# Patient Record
Sex: FEMALE | Race: OTHER | HISPANIC OR LATINO
[De-identification: names, ages, dates, MRNs, and addresses within clinical notes are randomized per-mention and may not be internally consistent; named-entity substitution may affect disease eponyms.]

---

## 2020-03-24 ENCOUNTER — APPOINTMENT (OUTPATIENT)
Dept: INTERNAL MEDICINE | Facility: CLINIC | Age: 55
End: 2020-03-24

## 2020-06-04 ENCOUNTER — APPOINTMENT (OUTPATIENT)
Dept: INTERNAL MEDICINE | Facility: CLINIC | Age: 55
End: 2020-06-04

## 2020-07-07 ENCOUNTER — APPOINTMENT (OUTPATIENT)
Dept: INTERNAL MEDICINE | Facility: CLINIC | Age: 55
End: 2020-07-07
Payer: COMMERCIAL

## 2020-07-07 ENCOUNTER — NON-APPOINTMENT (OUTPATIENT)
Age: 55
End: 2020-07-07

## 2020-07-07 VITALS
HEIGHT: 60 IN | BODY MASS INDEX: 25.13 KG/M2 | HEART RATE: 79 BPM | WEIGHT: 128 LBS | DIASTOLIC BLOOD PRESSURE: 80 MMHG | SYSTOLIC BLOOD PRESSURE: 110 MMHG

## 2020-07-07 DIAGNOSIS — Z78.9 OTHER SPECIFIED HEALTH STATUS: ICD-10-CM

## 2020-07-07 PROCEDURE — 93000 ELECTROCARDIOGRAM COMPLETE: CPT

## 2020-07-07 PROCEDURE — 36415 COLL VENOUS BLD VENIPUNCTURE: CPT

## 2020-07-07 PROCEDURE — 99213 OFFICE O/P EST LOW 20 MIN: CPT | Mod: 25

## 2020-07-07 PROCEDURE — 99386 PREV VISIT NEW AGE 40-64: CPT | Mod: 25

## 2020-07-07 NOTE — REVIEW OF SYSTEMS
[Chest Pain] : chest pain [Dysuria] : dysuria [Joint Pain] : joint pain [Headache] : headache [Negative] : Respiratory [Back Pain] : back pain [Anxiety] : anxiety [Fever] : no fever [Chills] : no chills [Hearing Loss] : no hearing loss [Abdominal Pain] : no abdominal pain [Palpitations] : no palpitations [Nausea] : no nausea [Vomiting] : no vomiting [Heartburn] : no heartburn [Frequency] : no frequency [Hematuria] : no hematuria [Joint Swelling] : no joint swelling [Dizziness] : no dizziness [Skin Rash] : no skin rash [Muscle Weakness] : no muscle weakness [Memory Loss] : no memory loss [Depression] : no depression [Easy Bleeding] : no easy bleeding [FreeTextEntry5] : right sided cp x yrs off and on sans n/v/d, not related to activity [FreeTextEntry3] : last eye exam >4 yrs ago [FreeTextEntry7] : c [FreeTextEntry8] : no kidney disease or stones

## 2020-07-07 NOTE — PLAN
[FreeTextEntry1] : check labs and EKG\par Echocardiogram for heart murmur\par referral for colonoscopy\par Covid antibody\par screening mammograms\par Return p.r.n.

## 2020-07-07 NOTE — ASSESSMENT
[FreeTextEntry1] : CPE- healthy but mildly anxious woman with a variety of mild somatic complaints unchanged for many years. Will request old records and immunizations. Order screening mammos. She states last PAP was <1 yr ago. Refer for colonoscopy as her last one was 5 years ago and she had polyps reported.\par \par heart murmur- she does not recollect anyone telling her she had a heart murmur before. Will check echocardiogram although I suspect this is a benign flow murmur. I explained this to the patient as opposed to structural valvular disease producing a murmur. she appeared to understand.\par \par Hyperlipidemia- Check lipid profile and liver function tests. Urged low cholesterol diet. Continue medications. LDL goal is less than 130 mg/dl. Last LDL was unknown.\par \par She consents to covid ab testing but declines HIV.

## 2020-07-07 NOTE — HEALTH RISK ASSESSMENT
[Patient reported colonoscopy was abnormal] : Patient reported colonoscopy was abnormal [Patient reported PAP Smear was normal] : Patient reported PAP Smear was normal [HIV test declined] : HIV test declined [ColonoscopyDate] : 07/15 [PapSmearDate] : 07/19 [ColonoscopyComments] : +polyp [HIVDate] : 07/20

## 2020-07-07 NOTE — HISTORY OF PRESENT ILLNESS
[FreeTextEntry1] : new cpe [de-identified] : Former [place] resident; had last cpe 1 yr ao. Told of mild chol issue

## 2020-07-07 NOTE — PHYSICAL EXAM
[No Acute Distress] : no acute distress [Well Developed] : well developed [Well Nourished] : well nourished [PERRL] : pupils equal round and reactive to light [Normal Sclera/Conjunctiva] : normal sclera/conjunctiva [Well-Appearing] : well-appearing [EOMI] : extraocular movements intact [Normal Oropharynx] : the oropharynx was normal [Normal Outer Ear/Nose] : the outer ears and nose were normal in appearance [No JVD] : no jugular venous distention [No Lymphadenopathy] : no lymphadenopathy [Supple] : supple [Thyroid Normal, No Nodules] : the thyroid was normal and there were no nodules present [No Respiratory Distress] : no respiratory distress  [No Accessory Muscle Use] : no accessory muscle use [Clear to Auscultation] : lungs were clear to auscultation bilaterally [Regular Rhythm] : with a regular rhythm [Normal Rate] : normal rate  [No Murmur] : no murmur heard [No Carotid Bruits] : no carotid bruits [Normal S1, S2] : normal S1 and S2 [No Varicosities] : no varicosities [No Abdominal Bruit] : a ~M bruit was not heard ~T in the abdomen [No Palpable Aorta] : no palpable aorta [Pedal Pulses Present] : the pedal pulses are present [No Edema] : there was no peripheral edema [No Extremity Clubbing/Cyanosis] : no extremity clubbing/cyanosis [Non Tender] : non-tender [Soft] : abdomen soft [No Masses] : no abdominal mass palpated [No HSM] : no HSM [Non-distended] : non-distended [Normal Bowel Sounds] : normal bowel sounds [Normal Anterior Cervical Nodes] : no anterior cervical lymphadenopathy [Normal Posterior Cervical Nodes] : no posterior cervical lymphadenopathy [No CVA Tenderness] : no CVA  tenderness [No Spinal Tenderness] : no spinal tenderness [No Rash] : no rash [No Joint Swelling] : no joint swelling [Grossly Normal Strength/Tone] : grossly normal strength/tone [Coordination Grossly Intact] : coordination grossly intact [No Focal Deficits] : no focal deficits [Deep Tendon Reflexes (DTR)] : deep tendon reflexes were 2+ and symmetric [Normal Gait] : normal gait [Normal Affect] : the affect was normal [Normal Insight/Judgement] : insight and judgment were intact

## 2020-07-08 LAB
ALBUMIN SERPL ELPH-MCNC: 4.9 G/DL
ALP BLD-CCNC: 89 U/L
ALT SERPL-CCNC: 31 U/L
ANION GAP SERPL CALC-SCNC: 18 MMOL/L
APPEARANCE: CLEAR
AST SERPL-CCNC: 23 U/L
BASOPHILS # BLD AUTO: 0.04 K/UL
BASOPHILS NFR BLD AUTO: 0.5 %
BILIRUB SERPL-MCNC: 0.5 MG/DL
BILIRUBIN URINE: NEGATIVE
BLOOD URINE: NEGATIVE
BUN SERPL-MCNC: 14 MG/DL
CALCIUM SERPL-MCNC: 9.9 MG/DL
CHLORIDE SERPL-SCNC: 104 MMOL/L
CHOLEST SERPL-MCNC: 240 MG/DL
CHOLEST/HDLC SERPL: 5.5 RATIO
CO2 SERPL-SCNC: 20 MMOL/L
COLOR: NORMAL
CREAT SERPL-MCNC: 0.51 MG/DL
EOSINOPHIL # BLD AUTO: 0.1 K/UL
EOSINOPHIL NFR BLD AUTO: 1.3 %
GLUCOSE QUALITATIVE U: NEGATIVE
GLUCOSE SERPL-MCNC: 96 MG/DL
HCT VFR BLD CALC: 43.5 %
HDLC SERPL-MCNC: 44 MG/DL
HGB BLD-MCNC: 13.7 G/DL
IMM GRANULOCYTES NFR BLD AUTO: 0.1 %
KETONES URINE: NEGATIVE
LDLC SERPL CALC-MCNC: 159 MG/DL
LEUKOCYTE ESTERASE URINE: NEGATIVE
LYMPHOCYTES # BLD AUTO: 3.06 K/UL
LYMPHOCYTES NFR BLD AUTO: 41.1 %
MAN DIFF?: NORMAL
MCHC RBC-ENTMCNC: 30.6 PG
MCHC RBC-ENTMCNC: 31.5 GM/DL
MCV RBC AUTO: 97.3 FL
MONOCYTES # BLD AUTO: 0.34 K/UL
MONOCYTES NFR BLD AUTO: 4.6 %
NEUTROPHILS # BLD AUTO: 3.89 K/UL
NEUTROPHILS NFR BLD AUTO: 52.4 %
NITRITE URINE: NEGATIVE
PH URINE: 6
PLATELET # BLD AUTO: 253 K/UL
POTASSIUM SERPL-SCNC: 4.4 MMOL/L
PROT SERPL-MCNC: 7.3 G/DL
PROTEIN URINE: NEGATIVE
RBC # BLD: 4.47 M/UL
RBC # FLD: 14 %
SARS-COV-2 IGG SERPL IA-ACNC: 0.08 INDEX
SARS-COV-2 IGG SERPL QL IA: NEGATIVE
SODIUM SERPL-SCNC: 141 MMOL/L
SPECIFIC GRAVITY URINE: 1.02
TRIGL SERPL-MCNC: 188 MG/DL
TSH SERPL-ACNC: 1.63 UIU/ML
UROBILINOGEN URINE: NORMAL
WBC # FLD AUTO: 7.44 K/UL

## 2020-07-20 ENCOUNTER — APPOINTMENT (OUTPATIENT)
Dept: GASTROENTEROLOGY | Facility: CLINIC | Age: 55
End: 2020-07-20
Payer: COMMERCIAL

## 2020-07-20 VITALS — TEMPERATURE: 98.7 F | HEART RATE: 67 BPM | DIASTOLIC BLOOD PRESSURE: 60 MMHG | SYSTOLIC BLOOD PRESSURE: 116 MMHG

## 2020-07-20 PROCEDURE — 99202 OFFICE O/P NEW SF 15 MIN: CPT

## 2020-07-20 NOTE — PHYSICAL EXAM
[General Appearance - Alert] : alert [General Appearance - In No Acute Distress] : in no acute distress [Sclera] : the sclera and conjunctiva were normal [Outer Ear] : the ears and nose were normal in appearance [Neck Appearance] : the appearance of the neck was normal [] : no respiratory distress [Abdomen Soft] : soft [Abdomen Tenderness] : non-tender [FreeTextEntry1] : deferred to colonoscopy [Skin Color & Pigmentation] : normal skin color and pigmentation [Abnormal Walk] : normal gait [Oriented To Time, Place, And Person] : oriented to person, place, and time [Cranial Nerves] : cranial nerves 2-12 were intact

## 2020-07-20 NOTE — HISTORY OF PRESENT ILLNESS
[FreeTextEntry1] : 54 year F with HLD, heart murmur\par seen at the request of Dr. Landaverde for the evaluation of colon cancer screening. \par Last colonoscopy in the [facility] in 2015. this was reportedly done for rectal bleeding, a polyp was found and she was told to return in 5 years. \par she has occasional constipation. \par \par Patient denies abdominal pain , n/v/heartburn, reflux, dysphagia/odynophagia, change in bowel habits, diarrhea,  brbpr, melena. no weight loss.  Good appetite and energy level. Patient has daily BM, denies regular NSAID use. \par \par fam hx: negative for colon polyps or colon cancer\par

## 2020-07-21 ENCOUNTER — RESULT REVIEW (OUTPATIENT)
Age: 55
End: 2020-07-21

## 2020-08-02 ENCOUNTER — RESULT REVIEW (OUTPATIENT)
Age: 55
End: 2020-08-02

## 2020-08-03 ENCOUNTER — RESULT REVIEW (OUTPATIENT)
Age: 55
End: 2020-08-03

## 2020-08-04 ENCOUNTER — APPOINTMENT (OUTPATIENT)
Dept: GASTROENTEROLOGY | Facility: HOSPITAL | Age: 55
End: 2020-08-04

## 2020-09-05 ENCOUNTER — APPOINTMENT (OUTPATIENT)
Dept: INTERNAL MEDICINE | Facility: CLINIC | Age: 55
End: 2020-09-05
Payer: COMMERCIAL

## 2020-09-05 VITALS
SYSTOLIC BLOOD PRESSURE: 118 MMHG | DIASTOLIC BLOOD PRESSURE: 62 MMHG | OXYGEN SATURATION: 99 % | WEIGHT: 125 LBS | BODY MASS INDEX: 24.41 KG/M2 | HEART RATE: 68 BPM | TEMPERATURE: 97.8 F

## 2020-09-05 DIAGNOSIS — R35.0 FREQUENCY OF MICTURITION: ICD-10-CM

## 2020-09-05 LAB
BILIRUB UR QL STRIP: NORMAL
GLUCOSE UR-MCNC: NORMAL
HCG UR QL: 0.2 EU/DL
HGB UR QL STRIP.AUTO: NORMAL
KETONES UR-MCNC: NORMAL
LEUKOCYTE ESTERASE UR QL STRIP: NORMAL
NITRITE UR QL STRIP: NORMAL
PH UR STRIP: 5.5
PROT UR STRIP-MCNC: NORMAL
SP GR UR STRIP: NORMAL

## 2020-09-05 PROCEDURE — 99213 OFFICE O/P EST LOW 20 MIN: CPT | Mod: 25

## 2020-09-05 PROCEDURE — G0442 ANNUAL ALCOHOL SCREEN 15 MIN: CPT

## 2020-09-05 PROCEDURE — 81003 URINALYSIS AUTO W/O SCOPE: CPT | Mod: QW

## 2020-09-05 NOTE — REVIEW OF SYSTEMS
[Chills] : no chills [Fever] : no fever [Dysuria] : no dysuria [Incontinence] : no incontinence [Hematuria] : no hematuria [Frequency] : frequency

## 2020-09-05 NOTE — ASSESSMENT
[FreeTextEntry1] : urinary frequency- I discussed with patient that her urinalysis today and one 2 months ago were both benign and at 6 months of symptoms argue strongly against an infectious process. She could have a spastic bladder or an other structural abnormality for which I recommend she see a urologist. Empiric trial of 3 days of antibiotic discussed with patient.  Refer to Dr. Cain.

## 2020-09-09 LAB — BACTERIA UR CULT: ABNORMAL

## 2020-10-17 ENCOUNTER — APPOINTMENT (OUTPATIENT)
Dept: INTERNAL MEDICINE | Facility: CLINIC | Age: 55
End: 2020-10-17
Payer: COMMERCIAL

## 2020-10-17 VITALS — HEART RATE: 66 BPM | HEIGHT: 60 IN | SYSTOLIC BLOOD PRESSURE: 110 MMHG | DIASTOLIC BLOOD PRESSURE: 60 MMHG

## 2020-10-17 VITALS — WEIGHT: 130 LBS | BODY MASS INDEX: 25.39 KG/M2

## 2020-10-17 PROCEDURE — 99213 OFFICE O/P EST LOW 20 MIN: CPT | Mod: 25

## 2020-10-17 PROCEDURE — G0008: CPT

## 2020-10-17 PROCEDURE — 90686 IIV4 VACC NO PRSV 0.5 ML IM: CPT

## 2020-10-17 NOTE — ASSESSMENT
[FreeTextEntry1] : Breast- no abnl noted. D/w pt periareolar swellings that come and go over years not likely to be malignant and her recent mammos were negative. I in addition, feels no abnl mass. Reassured.\par \par Risks/benefits of flu vaccine discussed with patient and patient understands and accepts.\par \par Reviewed old chart: Old chart reviewed: chronic back and other pains; hct 31.6 mcv 83 in 2018

## 2020-10-17 NOTE — HISTORY OF PRESENT ILLNESS
[FreeTextEntry8] : Here for flu vaccine but also:\par She complains of several years history of minute breast swellings around her nipples, particularly her left. For the last 3 weeks her left breast nipple has these swellings. They come and go, are < 5 mm in size, soft, are not tender or painful and then not associated with any nipple discharge. Her last mammogram was 3 months ago and was normal.

## 2020-10-17 NOTE — PHYSICAL EXAM
[Normal Appearance] : normal in appearance [Normal] : no acute distress, well nourished, well developed and well-appearing [No Masses] : no palpable masses [No Nipple Discharge] : no nipple discharge [de-identified] : no warmth, erythema, or tenderness left breast

## 2021-02-10 ENCOUNTER — APPOINTMENT (OUTPATIENT)
Dept: INTERNAL MEDICINE | Facility: CLINIC | Age: 56
End: 2021-02-10
Payer: COMMERCIAL

## 2021-02-10 ENCOUNTER — NON-APPOINTMENT (OUTPATIENT)
Age: 56
End: 2021-02-10

## 2021-02-10 VITALS
SYSTOLIC BLOOD PRESSURE: 122 MMHG | HEART RATE: 68 BPM | DIASTOLIC BLOOD PRESSURE: 70 MMHG | BODY MASS INDEX: 25.13 KG/M2 | WEIGHT: 128 LBS | HEIGHT: 60 IN

## 2021-02-10 PROCEDURE — 99072 ADDL SUPL MATRL&STAF TM PHE: CPT

## 2021-02-10 PROCEDURE — 99213 OFFICE O/P EST LOW 20 MIN: CPT | Mod: 25

## 2021-02-10 NOTE — REVIEW OF SYSTEMS
[Chest Pain] : chest pain [Abdominal Pain] : abdominal pain [Chills] : no chills [Fever] : no fever [Recent Change In Weight] : ~T no recent weight change [Shortness Of Breath] : no shortness of breath [Nausea] : no nausea [Vomiting] : no vomiting [Heartburn] : no heartburn [FreeTextEntry5] : right back lung

## 2021-02-10 NOTE — ASSESSMENT
[FreeTextEntry1] : Right flank/upper back pain- likely mm-skel. No evidence for intrinsic lung pathology and she has no pulmonary symptoms.\par \par lower abdominal bloating- unknown organic pathology presently however depending on what medicine [facility] urologist gave her she could be experiencing a side effect of it. Specifically in mind his constipation with slowing of bowel motility and hence some bloating. I will check her labs and include inflammatory markers. She will call me back with the name of the urologic medication which I suspect is in the ditropan family.

## 2021-02-10 NOTE — HISTORY OF PRESENT ILLNESS
[FreeTextEntry8] : Several months h/o with lower abd bloating constantly. + fever, with diarrhea 2 months ago. Covid negative then. Also c/o sharp right flank discomfort radiating to right mid lung burning. Taking med from  but does not know name x 2 months

## 2021-02-11 ENCOUNTER — NON-APPOINTMENT (OUTPATIENT)
Age: 56
End: 2021-02-11

## 2021-02-11 LAB
ALBUMIN SERPL ELPH-MCNC: 4.9 G/DL
ALP BLD-CCNC: 91 U/L
ALT SERPL-CCNC: 31 U/L
ANION GAP SERPL CALC-SCNC: 14 MMOL/L
AST SERPL-CCNC: 25 U/L
BASOPHILS # BLD AUTO: 0.04 K/UL
BASOPHILS NFR BLD AUTO: 0.6 %
BILIRUB SERPL-MCNC: 0.6 MG/DL
BUN SERPL-MCNC: 11 MG/DL
CALCIUM SERPL-MCNC: 9.6 MG/DL
CHLORIDE SERPL-SCNC: 104 MMOL/L
CO2 SERPL-SCNC: 21 MMOL/L
CREAT SERPL-MCNC: 0.58 MG/DL
CRP SERPL-MCNC: 0.26 MG/DL
EOSINOPHIL # BLD AUTO: 0.18 K/UL
EOSINOPHIL NFR BLD AUTO: 2.6 %
ERYTHROCYTE [SEDIMENTATION RATE] IN BLOOD BY WESTERGREN METHOD: 23 MM/HR
GLUCOSE SERPL-MCNC: 95 MG/DL
HCT VFR BLD CALC: 39.6 %
HGB BLD-MCNC: 13 G/DL
IMM GRANULOCYTES NFR BLD AUTO: 0.3 %
LYMPHOCYTES # BLD AUTO: 3.36 K/UL
LYMPHOCYTES NFR BLD AUTO: 48.7 %
MAN DIFF?: NORMAL
MCHC RBC-ENTMCNC: 30.7 PG
MCHC RBC-ENTMCNC: 32.8 GM/DL
MCV RBC AUTO: 93.6 FL
MONOCYTES # BLD AUTO: 0.39 K/UL
MONOCYTES NFR BLD AUTO: 5.7 %
NEUTROPHILS # BLD AUTO: 2.91 K/UL
NEUTROPHILS NFR BLD AUTO: 42.1 %
PLATELET # BLD AUTO: 270 K/UL
POTASSIUM SERPL-SCNC: 3.9 MMOL/L
PROT SERPL-MCNC: 7.5 G/DL
RBC # BLD: 4.23 M/UL
RBC # FLD: 13.5 %
SODIUM SERPL-SCNC: 139 MMOL/L
WBC # FLD AUTO: 6.9 K/UL

## 2021-03-22 ENCOUNTER — NON-APPOINTMENT (OUTPATIENT)
Age: 56
End: 2021-03-22

## 2021-04-14 ENCOUNTER — APPOINTMENT (OUTPATIENT)
Dept: INTERNAL MEDICINE | Facility: CLINIC | Age: 56
End: 2021-04-14
Payer: COMMERCIAL

## 2021-04-14 VITALS
DIASTOLIC BLOOD PRESSURE: 80 MMHG | BODY MASS INDEX: 25.13 KG/M2 | SYSTOLIC BLOOD PRESSURE: 130 MMHG | HEIGHT: 60 IN | WEIGHT: 128 LBS

## 2021-04-14 PROCEDURE — 99213 OFFICE O/P EST LOW 20 MIN: CPT | Mod: 25

## 2021-04-14 PROCEDURE — 99072 ADDL SUPL MATRL&STAF TM PHE: CPT

## 2021-04-14 NOTE — ASSESSMENT
[FreeTextEntry1] : elevated blood pressure without diagnosis of hypertension- I discussed with her that people's blood pressure is often elevated in the emergency room especially when there are no pain. Review of her blood pressures during  office visits have always been normal, and she does not have hypertension.\par \par Elevated fingerstick glucose- I discussed with very that sugars that are nonfasting we'll often appear to be elevated. She has just finished eating lunch now tends that sugar will be elevated also. I explained that there are criteria for diagnosing diabetes based on fasting and random glucoses as well his A1c. I explained what an A1c was. We will check a random sugar and A1c now.

## 2021-04-14 NOTE — PHYSICAL EXAM
[Well Nourished] : well nourished [Well Developed] : well developed [Well-Appearing] : well-appearing [Normal Rate] : normal rate

## 2021-04-14 NOTE — HISTORY OF PRESENT ILLNESS
[FreeTextEntry1] : here to rule out diabetes and hypertension [de-identified] : She reports she was injured at work last week and on 4/8/21 went to [facility] emergency room where she was told her blood pressure was high and that a fingerstick sugar was elevated. She was not fasting at the time and does not remember the value of the sugar and nor value of her blood pressure. I have reviewed the record remotely and see her blood pressure was 160/78.

## 2021-04-15 ENCOUNTER — NON-APPOINTMENT (OUTPATIENT)
Age: 56
End: 2021-04-15

## 2021-04-15 LAB
ANION GAP SERPL CALC-SCNC: 12 MMOL/L
BUN SERPL-MCNC: 17 MG/DL
CALCIUM SERPL-MCNC: 9.4 MG/DL
CHLORIDE SERPL-SCNC: 106 MMOL/L
CO2 SERPL-SCNC: 20 MMOL/L
CREAT SERPL-MCNC: 0.46 MG/DL
ESTIMATED AVERAGE GLUCOSE: 123 MG/DL
GLUCOSE SERPL-MCNC: 107 MG/DL
HBA1C MFR BLD HPLC: 5.9 %
POTASSIUM SERPL-SCNC: 4.3 MMOL/L
SODIUM SERPL-SCNC: 138 MMOL/L

## 2021-05-03 ENCOUNTER — HOSPITAL ENCOUNTER (OUTPATIENT)
Dept: HOSPITAL 74 - JASU-SURG | Age: 56
Discharge: HOME | End: 2021-05-03
Attending: UROLOGY
Payer: COMMERCIAL

## 2021-05-03 VITALS — HEART RATE: 60 BPM

## 2021-05-03 VITALS — TEMPERATURE: 97.8 F | SYSTOLIC BLOOD PRESSURE: 136 MMHG | DIASTOLIC BLOOD PRESSURE: 79 MMHG

## 2021-05-03 VITALS — BODY MASS INDEX: 25.4 KG/M2

## 2021-05-03 DIAGNOSIS — N20.0: Primary | ICD-10-CM

## 2021-05-03 PROCEDURE — 0TF3XZZ FRAGMENTATION IN RIGHT KIDNEY PELVIS, EXTERNAL APPROACH: ICD-10-PCS | Performed by: UROLOGY

## 2021-06-25 ENCOUNTER — APPOINTMENT (OUTPATIENT)
Dept: OBGYN | Facility: CLINIC | Age: 56
End: 2021-06-25
Payer: COMMERCIAL

## 2021-06-25 VITALS
BODY MASS INDEX: 26.11 KG/M2 | DIASTOLIC BLOOD PRESSURE: 76 MMHG | HEIGHT: 60 IN | WEIGHT: 133 LBS | SYSTOLIC BLOOD PRESSURE: 120 MMHG

## 2021-06-25 PROCEDURE — 99203 OFFICE O/P NEW LOW 30 MIN: CPT

## 2021-06-25 NOTE — HISTORY OF PRESENT ILLNESS
[FreeTextEntry1] : 55 year old Kazakh-speaking postmenopausal  presents for evaluation of pelvic discomfort. Reports that for the last six months she has had discomfort on the left side of her pelvis. She has no vaginal bleeding or discharge and does not need to take any pain medication. She is occasionally sexually active and does not experience the discomfort with sex. Her bowel and urinary habits are normal. Unsure if she has a history of fibroids or cysts on the ovaries.

## 2021-06-25 NOTE — PLAN
[FreeTextEntry1] : Pain/discomfort not localized on exam. Unclear whether it is of gynecologic origin. Will send for TVUS for further evaluation. Pt to call after ultrasound to discuss results.

## 2021-07-07 ENCOUNTER — RESULT REVIEW (OUTPATIENT)
Age: 56
End: 2021-07-07

## 2021-07-13 ENCOUNTER — APPOINTMENT (OUTPATIENT)
Dept: INTERNAL MEDICINE | Facility: CLINIC | Age: 56
End: 2021-07-13
Payer: COMMERCIAL

## 2021-07-13 VITALS
BODY MASS INDEX: 25.52 KG/M2 | DIASTOLIC BLOOD PRESSURE: 80 MMHG | WEIGHT: 130 LBS | HEIGHT: 60 IN | SYSTOLIC BLOOD PRESSURE: 120 MMHG | HEART RATE: 66 BPM

## 2021-07-13 DIAGNOSIS — Z56.0 UNEMPLOYMENT, UNSPECIFIED: ICD-10-CM

## 2021-07-13 PROCEDURE — 93000 ELECTROCARDIOGRAM COMPLETE: CPT | Mod: 59

## 2021-07-13 PROCEDURE — 99396 PREV VISIT EST AGE 40-64: CPT | Mod: 25

## 2021-07-13 PROCEDURE — G0444 DEPRESSION SCREEN ANNUAL: CPT | Mod: 59

## 2021-07-13 SDOH — ECONOMIC STABILITY - INCOME SECURITY: UNEMPLOYMENT, UNSPECIFIED: Z56.0

## 2021-07-13 NOTE — HEALTH RISK ASSESSMENT
[No] : In the past 12 months have you used drugs other than those required for medical reasons? No [1] : 2) Feeling down, depressed, or hopeless for several days (1) [PHQ-9 Negative - No further assessment needed] : PHQ-9 Negative - No further assessment needed [[id number]] : 2 [HIV test declined] : HIV test declined [HIVDate] : 07/21

## 2021-07-13 NOTE — PHYSICAL EXAM
[No Acute Distress] : no acute distress [Well Nourished] : well nourished [Well Developed] : well developed [Well-Appearing] : well-appearing [Normal Sclera/Conjunctiva] : normal sclera/conjunctiva [PERRL] : pupils equal round and reactive to light [EOMI] : extraocular movements intact [Normal Outer Ear/Nose] : the outer ears and nose were normal in appearance [Normal Oropharynx] : the oropharynx was normal [No JVD] : no jugular venous distention [No Lymphadenopathy] : no lymphadenopathy [Supple] : supple [Thyroid Normal, No Nodules] : the thyroid was normal and there were no nodules present [No Respiratory Distress] : no respiratory distress  [No Accessory Muscle Use] : no accessory muscle use [Clear to Auscultation] : lungs were clear to auscultation bilaterally [Normal Rate] : normal rate  [Regular Rhythm] : with a regular rhythm [Normal S1, S2] : normal S1 and S2 [No Carotid Bruits] : no carotid bruits [No Abdominal Bruit] : a ~M bruit was not heard ~T in the abdomen [No Varicosities] : no varicosities [Pedal Pulses Present] : the pedal pulses are present [No Edema] : there was no peripheral edema [No Palpable Aorta] : no palpable aorta [No Extremity Clubbing/Cyanosis] : no extremity clubbing/cyanosis [Soft] : abdomen soft [Non Tender] : non-tender [Non-distended] : non-distended [No Masses] : no abdominal mass palpated [No HSM] : no HSM [Normal Bowel Sounds] : normal bowel sounds [Normal Posterior Cervical Nodes] : no posterior cervical lymphadenopathy [Normal Anterior Cervical Nodes] : no anterior cervical lymphadenopathy [No CVA Tenderness] : no CVA  tenderness [No Spinal Tenderness] : no spinal tenderness [No Joint Swelling] : no joint swelling [Grossly Normal Strength/Tone] : grossly normal strength/tone [No Rash] : no rash [Coordination Grossly Intact] : coordination grossly intact [No Focal Deficits] : no focal deficits [Normal Gait] : normal gait [Deep Tendon Reflexes (DTR)] : deep tendon reflexes were 2+ and symmetric [Normal Affect] : the affect was normal [Normal Insight/Judgement] : insight and judgment were intact [de-identified] : 1-2/6 JIE LUSB sans radiation

## 2021-07-13 NOTE — ASSESSMENT
[FreeTextEntry1] : CPE- healthy person up-to-date on health maintenance measures. Also up-to-date on recommended vaccines. Urged weight loss through proper diet and regular exercising.\par \par heart murmur- Again, she does not recollect anyone telling her she had a heart murmur before. Will check echocardiogram although I suspect this is a benign flow murmur. I explained this to the patient as opposed to structural valvular disease producing a murmur. she appeared to understand.\par \par Hyperlipidemia- Check lipid profile and liver function tests. Urged low cholesterol diet. Continue medications. LDL goal is less than 130 mg/dl. Last LDL was 159 \par \par Elevated glucose- D/w patient criteria for prediabetes and diabetes by FBS and A1c. Check labs. she was to have come fasting on April 14 visit however he can just after eating lunch.\par \par she states she received to Pfizer vaccines. She will call me back with the dates.

## 2021-07-13 NOTE — REVIEW OF SYSTEMS
[Negative] : Respiratory [Headache] : headache [Fever] : no fever [Chills] : no chills [Hearing Loss] : no hearing loss [Chest Pain] : no chest pain [Palpitations] : no palpitations [Abdominal Pain] : no abdominal pain [Nausea] : no nausea [Vomiting] : no vomiting [Heartburn] : no heartburn [Dysuria] : no dysuria [Hematuria] : no hematuria [Frequency] : no frequency [Muscle Weakness] : no muscle weakness [Skin Rash] : no skin rash [Dizziness] : no dizziness [Anxiety] : no anxiety [Depression] : no depression [Easy Bleeding] : no easy bleeding [FreeTextEntry3] : last eye exam 1 month ago [FreeTextEntry8] : +renal stone [FreeTextEntry9] : pains all over back, anterior abdomen twice a week 3-4 months [de-identified] : told of pinched nerves on MRI recently

## 2021-07-13 NOTE — PLAN
[FreeTextEntry1] : check labs and EKG\par Order echocardiogram and mammograms\par return p.r.n.\par

## 2021-07-14 ENCOUNTER — NON-APPOINTMENT (OUTPATIENT)
Age: 56
End: 2021-07-14

## 2021-07-14 LAB
ALBUMIN SERPL ELPH-MCNC: 4.7 G/DL
ALP BLD-CCNC: 90 U/L
ALT SERPL-CCNC: 33 U/L
ANION GAP SERPL CALC-SCNC: 13 MMOL/L
APPEARANCE: CLEAR
AST SERPL-CCNC: 22 U/L
BASOPHILS # BLD AUTO: 0.05 K/UL
BASOPHILS NFR BLD AUTO: 0.7 %
BILIRUB DIRECT SERPL-MCNC: 0.1 MG/DL
BILIRUB INDIRECT SERPL-MCNC: 0.4 MG/DL
BILIRUB SERPL-MCNC: 0.6 MG/DL
BILIRUBIN URINE: NEGATIVE
BLOOD URINE: NEGATIVE
BUN SERPL-MCNC: 12 MG/DL
CALCIUM SERPL-MCNC: 9.8 MG/DL
CHLORIDE SERPL-SCNC: 103 MMOL/L
CHOLEST SERPL-MCNC: 255 MG/DL
CO2 SERPL-SCNC: 24 MMOL/L
COLOR: NORMAL
CREAT SERPL-MCNC: 0.54 MG/DL
EOSINOPHIL # BLD AUTO: 0.15 K/UL
EOSINOPHIL NFR BLD AUTO: 2.1 %
ESTIMATED AVERAGE GLUCOSE: 126 MG/DL
GLUCOSE QUALITATIVE U: NEGATIVE
GLUCOSE SERPL-MCNC: 99 MG/DL
HBA1C MFR BLD HPLC: 6 %
HCT VFR BLD CALC: 40.8 %
HDLC SERPL-MCNC: 40 MG/DL
HGB BLD-MCNC: 13 G/DL
IMM GRANULOCYTES NFR BLD AUTO: 0.1 %
KETONES URINE: NEGATIVE
LDLC SERPL CALC-MCNC: 170 MG/DL
LEUKOCYTE ESTERASE URINE: NEGATIVE
LYMPHOCYTES # BLD AUTO: 2.58 K/UL
LYMPHOCYTES NFR BLD AUTO: 35.3 %
MAN DIFF?: NORMAL
MCHC RBC-ENTMCNC: 29.6 PG
MCHC RBC-ENTMCNC: 31.9 GM/DL
MCV RBC AUTO: 92.9 FL
MONOCYTES # BLD AUTO: 0.37 K/UL
MONOCYTES NFR BLD AUTO: 5.1 %
NEUTROPHILS # BLD AUTO: 4.14 K/UL
NEUTROPHILS NFR BLD AUTO: 56.7 %
NITRITE URINE: NEGATIVE
NONHDLC SERPL-MCNC: 215 MG/DL
PH URINE: 6
PLATELET # BLD AUTO: 278 K/UL
POTASSIUM SERPL-SCNC: 4 MMOL/L
PROT SERPL-MCNC: 7.3 G/DL
PROTEIN URINE: NEGATIVE
RBC # BLD: 4.39 M/UL
RBC # FLD: 13.1 %
SODIUM SERPL-SCNC: 140 MMOL/L
SPECIFIC GRAVITY URINE: 1.02
TRIGL SERPL-MCNC: 226 MG/DL
TSH SERPL-ACNC: 1.94 UIU/ML
UROBILINOGEN URINE: NORMAL
WBC # FLD AUTO: 7.3 K/UL

## 2021-07-22 ENCOUNTER — RESULT REVIEW (OUTPATIENT)
Age: 56
End: 2021-07-22

## 2021-07-24 ENCOUNTER — RESULT REVIEW (OUTPATIENT)
Age: 56
End: 2021-07-24

## 2021-09-28 ENCOUNTER — APPOINTMENT (OUTPATIENT)
Dept: OBGYN | Facility: CLINIC | Age: 56
End: 2021-09-28
Payer: COMMERCIAL

## 2021-09-28 VITALS
WEIGHT: 129 LBS | BODY MASS INDEX: 25.32 KG/M2 | SYSTOLIC BLOOD PRESSURE: 120 MMHG | DIASTOLIC BLOOD PRESSURE: 70 MMHG | HEIGHT: 60 IN

## 2021-09-28 DIAGNOSIS — Z12.11 ENCOUNTER FOR SCREENING FOR MALIGNANT NEOPLASM OF COLON: ICD-10-CM

## 2021-09-28 PROCEDURE — 99396 PREV VISIT EST AGE 40-64: CPT

## 2021-09-28 NOTE — HISTORY OF PRESENT ILLNESS
[TextBox_4] : 55 year old Khmer-speaking postmenopausal  for annual exam. No complaints today. No postmenopausal bleeding. Sexually active without problems. Has urinary frequency which is being managed with a urologist. Up to date with breast and colon ca screening; no history of cervical dysplasia.

## 2021-09-30 LAB — HPV HIGH+LOW RISK DNA PNL CVX: NOT DETECTED

## 2021-10-05 LAB — CYTOLOGY CVX/VAG DOC THIN PREP: ABNORMAL

## 2021-12-06 ENCOUNTER — APPOINTMENT (OUTPATIENT)
Dept: INTERNAL MEDICINE | Facility: CLINIC | Age: 56
End: 2021-12-06
Payer: COMMERCIAL

## 2021-12-06 VITALS
SYSTOLIC BLOOD PRESSURE: 110 MMHG | BODY MASS INDEX: 25.72 KG/M2 | WEIGHT: 131 LBS | HEART RATE: 62 BPM | HEIGHT: 60 IN | DIASTOLIC BLOOD PRESSURE: 70 MMHG

## 2021-12-06 PROCEDURE — 90471 IMMUNIZATION ADMIN: CPT

## 2021-12-06 PROCEDURE — 99212 OFFICE O/P EST SF 10 MIN: CPT | Mod: 25

## 2021-12-06 PROCEDURE — 90686 IIV4 VACC NO PRSV 0.5 ML IM: CPT

## 2021-12-06 NOTE — ASSESSMENT
[FreeTextEntry1] : left maxillary sinus radiopacity- I discussed this at length with patient and strongly recommended she seek oral surgery consultation as indicated on the note from [facility].\par \par Risks/benefits of flu vaccine discussed with patient and patient understands and accepts.\par

## 2021-12-06 NOTE — PHYSICAL EXAM
[Normal] : no acute distress, well nourished, well developed and well-appearing [de-identified] : no maxillary sinus tenderness or swelling noted

## 2021-12-06 NOTE — HISTORY OF PRESENT ILLNESS
[FreeTextEntry1] : here for consultation regarding dental abnormality and a flu vaccine [de-identified] : She went to a dental cleaning at "recently where x-rays revealed a left maxillary sinus "radiopacity". she does not understand what she was told and is frightened by the possibility of cancer. she reports no dental pain, swelling, or tenderness.

## 2021-12-20 ENCOUNTER — APPOINTMENT (OUTPATIENT)
Dept: INTERNAL MEDICINE | Facility: CLINIC | Age: 56
End: 2021-12-20
Payer: COMMERCIAL

## 2021-12-20 VITALS
WEIGHT: 130 LBS | HEART RATE: 95 BPM | HEIGHT: 60 IN | SYSTOLIC BLOOD PRESSURE: 120 MMHG | DIASTOLIC BLOOD PRESSURE: 80 MMHG | BODY MASS INDEX: 25.52 KG/M2

## 2021-12-20 PROCEDURE — 99213 OFFICE O/P EST LOW 20 MIN: CPT | Mod: 25

## 2021-12-20 NOTE — ASSESSMENT
[FreeTextEntry1] : Tongue sensation- she previously mentioned a tongue abnormality for which I had referred her to an oral surgeon but she stated she did not know any and doubted her insurance covered one. I asked her to investigate but she did not. I will check labs for paresthesias and start with ENT. Her problem has not progressed and has been around for years so doubt urgency. If w/u and ENT eval negative, may need to find oral surgeon.

## 2021-12-20 NOTE — HISTORY OF PRESENT ILLNESS
[FreeTextEntry8] : has 3 year h/o anterior tongue burning off and on, lasts up to an entire day, random occurrences. States her anterior lip also swells. Tried mouthwashes sans effect.  Never mentioned it to any other MD's in the past. No nausea/vomiting.

## 2021-12-20 NOTE — REVIEW OF SYSTEMS
[Fever] : no fever [Chills] : no chills [Sore Throat] : no sore throat [Postnasal Drip] : no postnasal drip

## 2021-12-20 NOTE — PHYSICAL EXAM
[Normal] : no acute distress, well nourished, well developed and well-appearing [Normal Oropharynx] : the oropharynx was normal [No JVD] : no jugular venous distention [No Lymphadenopathy] : no lymphadenopathy [de-identified] : normal tongue and lower lip; no glosssitis or creased ear lobes

## 2021-12-22 LAB
ALBUMIN SERPL ELPH-MCNC: 4.8 G/DL
ALP BLD-CCNC: 86 U/L
ALT SERPL-CCNC: 28 U/L
ANION GAP SERPL CALC-SCNC: 14 MMOL/L
AST SERPL-CCNC: 22 U/L
BASOPHILS # BLD AUTO: 0.05 K/UL
BASOPHILS NFR BLD AUTO: 0.6 %
BILIRUB SERPL-MCNC: 0.3 MG/DL
BUN SERPL-MCNC: 9 MG/DL
CALCIUM SERPL-MCNC: 9.9 MG/DL
CHLORIDE SERPL-SCNC: 102 MMOL/L
CO2 SERPL-SCNC: 25 MMOL/L
CREAT SERPL-MCNC: 0.68 MG/DL
EOSINOPHIL # BLD AUTO: 0.14 K/UL
EOSINOPHIL NFR BLD AUTO: 1.6 %
GLUCOSE SERPL-MCNC: 106 MG/DL
HCT VFR BLD CALC: 40.6 %
HGB BLD-MCNC: 13.3 G/DL
IMM GRANULOCYTES NFR BLD AUTO: 0.1 %
LYMPHOCYTES # BLD AUTO: 3.54 K/UL
LYMPHOCYTES NFR BLD AUTO: 40.3 %
MAN DIFF?: NORMAL
MCHC RBC-ENTMCNC: 30.2 PG
MCHC RBC-ENTMCNC: 32.8 GM/DL
MCV RBC AUTO: 92.1 FL
MONOCYTES # BLD AUTO: 0.44 K/UL
MONOCYTES NFR BLD AUTO: 5 %
NEUTROPHILS # BLD AUTO: 4.6 K/UL
NEUTROPHILS NFR BLD AUTO: 52.4 %
PLATELET # BLD AUTO: 280 K/UL
POTASSIUM SERPL-SCNC: 3.9 MMOL/L
PROT SERPL-MCNC: 7.5 G/DL
RBC # BLD: 4.41 M/UL
RBC # FLD: 13 %
SODIUM SERPL-SCNC: 140 MMOL/L
T PALLIDUM AB SER QL IA: NEGATIVE
TSH SERPL-ACNC: 2.42 UIU/ML
VIT B12 SERPL-MCNC: 559 PG/ML
WBC # FLD AUTO: 8.78 K/UL

## 2022-06-20 ENCOUNTER — APPOINTMENT (OUTPATIENT)
Dept: INTERNAL MEDICINE | Facility: CLINIC | Age: 57
End: 2022-06-20
Payer: COMMERCIAL

## 2022-06-20 VITALS
WEIGHT: 126 LBS | BODY MASS INDEX: 24.74 KG/M2 | HEIGHT: 60 IN | DIASTOLIC BLOOD PRESSURE: 70 MMHG | HEART RATE: 85 BPM | SYSTOLIC BLOOD PRESSURE: 126 MMHG | RESPIRATION RATE: 18 BRPM

## 2022-06-20 VITALS
BODY MASS INDEX: 24.74 KG/M2 | SYSTOLIC BLOOD PRESSURE: 126 MMHG | DIASTOLIC BLOOD PRESSURE: 70 MMHG | HEIGHT: 60 IN | WEIGHT: 126 LBS | HEART RATE: 85 BPM

## 2022-06-20 PROCEDURE — 99213 OFFICE O/P EST LOW 20 MIN: CPT

## 2022-06-20 NOTE — PHYSICAL EXAM
[Normal] : no acute distress, well nourished, well developed and well-appearing [Normal Oropharynx] : the oropharynx was normal [No JVD] : no jugular venous distention [No Lymphadenopathy] : no lymphadenopathy [Normal Supraclavicular Nodes] : no supraclavicular lymphadenopathy [Normal Axillary Nodes] : no axillary lymphadenopathy [Normal Posterior Cervical Nodes] : no posterior cervical lymphadenopathy [Normal Anterior Cervical Nodes] : no anterior cervical lymphadenopathy [Normal Inguinal Nodes] : no inguinal lymphadenopathy [Normal Femoral Nodes] : no femoral lymphadenopathy

## 2022-06-20 NOTE — HISTORY OF PRESENT ILLNESS
[FreeTextEntry1] : Comes very late and missed half her schedule appt time\par here because her physical therapy doctor recommended she havve her axillary LNs checked [de-identified] : She notes no sweats, weight loss, ill feeling.

## 2022-07-14 ENCOUNTER — APPOINTMENT (OUTPATIENT)
Dept: NEUROLOGY | Facility: CLINIC | Age: 57
End: 2022-07-14

## 2022-07-14 ENCOUNTER — NON-APPOINTMENT (OUTPATIENT)
Age: 57
End: 2022-07-14

## 2022-07-14 ENCOUNTER — APPOINTMENT (OUTPATIENT)
Dept: INTERNAL MEDICINE | Facility: CLINIC | Age: 57
End: 2022-07-14

## 2022-07-14 VITALS
HEART RATE: 76 BPM | RESPIRATION RATE: 16 BRPM | SYSTOLIC BLOOD PRESSURE: 128 MMHG | TEMPERATURE: 98.7 F | BODY MASS INDEX: 24.54 KG/M2 | WEIGHT: 125 LBS | HEIGHT: 60 IN | DIASTOLIC BLOOD PRESSURE: 74 MMHG | OXYGEN SATURATION: 98 %

## 2022-07-14 DIAGNOSIS — Z84.1 FAMILY HISTORY OF DISORDERS OF KIDNEY AND URETER: ICD-10-CM

## 2022-07-14 DIAGNOSIS — R01.1 CARDIAC MURMUR, UNSPECIFIED: ICD-10-CM

## 2022-07-14 PROCEDURE — 99213 OFFICE O/P EST LOW 20 MIN: CPT | Mod: 25

## 2022-07-14 PROCEDURE — 36415 COLL VENOUS BLD VENIPUNCTURE: CPT

## 2022-07-14 PROCEDURE — 99396 PREV VISIT EST AGE 40-64: CPT | Mod: 25

## 2022-07-14 PROCEDURE — 93000 ELECTROCARDIOGRAM COMPLETE: CPT | Mod: 59

## 2022-07-14 PROCEDURE — G0444 DEPRESSION SCREEN ANNUAL: CPT | Mod: 59

## 2022-07-14 RX ORDER — TOLTERODINE TARTRATE 4 MG/1
4 CAPSULE, EXTENDED RELEASE ORAL
Qty: 30 | Refills: 0 | Status: ACTIVE | COMMUNITY
Start: 2022-07-11

## 2022-07-16 LAB
ALBUMIN SERPL ELPH-MCNC: 4.8 G/DL
ALP BLD-CCNC: 84 U/L
ALT SERPL-CCNC: 19 U/L
ANION GAP SERPL CALC-SCNC: 13 MMOL/L
APPEARANCE: CLEAR
AST SERPL-CCNC: 21 U/L
BASOPHILS # BLD AUTO: 0.04 K/UL
BASOPHILS NFR BLD AUTO: 0.6 %
BILIRUB SERPL-MCNC: 0.5 MG/DL
BILIRUBIN URINE: NEGATIVE
BLOOD URINE: NEGATIVE
BUN SERPL-MCNC: 11 MG/DL
CALCIUM SERPL-MCNC: 9.6 MG/DL
CHLORIDE SERPL-SCNC: 105 MMOL/L
CHOLEST SERPL-MCNC: 254 MG/DL
CO2 SERPL-SCNC: 24 MMOL/L
COLOR: COLORLESS
CREAT SERPL-MCNC: 0.48 MG/DL
CRP SERPL-MCNC: 4 MG/L
EGFR: 112 ML/MIN/1.73M2
EOSINOPHIL # BLD AUTO: 0.08 K/UL
EOSINOPHIL NFR BLD AUTO: 1.2 %
ERYTHROCYTE [SEDIMENTATION RATE] IN BLOOD BY WESTERGREN METHOD: 32 MM/HR
ESTIMATED AVERAGE GLUCOSE: 128 MG/DL
GLUCOSE QUALITATIVE U: NEGATIVE
GLUCOSE SERPL-MCNC: 101 MG/DL
HBA1C MFR BLD HPLC: 6.1 %
HCT VFR BLD CALC: 40.1 %
HDLC SERPL-MCNC: 48 MG/DL
HGB BLD-MCNC: 12.7 G/DL
IMM GRANULOCYTES NFR BLD AUTO: 0.1 %
KETONES URINE: NEGATIVE
LDLC SERPL CALC-MCNC: 177 MG/DL
LEUKOCYTE ESTERASE URINE: NEGATIVE
LYMPHOCYTES # BLD AUTO: 2.79 K/UL
LYMPHOCYTES NFR BLD AUTO: 40.9 %
MAN DIFF?: NORMAL
MCHC RBC-ENTMCNC: 29.7 PG
MCHC RBC-ENTMCNC: 31.7 GM/DL
MCV RBC AUTO: 93.9 FL
MONOCYTES # BLD AUTO: 0.34 K/UL
MONOCYTES NFR BLD AUTO: 5 %
NEUTROPHILS # BLD AUTO: 3.56 K/UL
NEUTROPHILS NFR BLD AUTO: 52.2 %
NITRITE URINE: NEGATIVE
NONHDLC SERPL-MCNC: 207 MG/DL
PH URINE: 6.5
PLATELET # BLD AUTO: 261 K/UL
POTASSIUM SERPL-SCNC: 3.8 MMOL/L
PROT SERPL-MCNC: 7.2 G/DL
PROTEIN URINE: NEGATIVE
RBC # BLD: 4.27 M/UL
RBC # FLD: 13.5 %
SODIUM SERPL-SCNC: 141 MMOL/L
SPECIFIC GRAVITY URINE: 1.01
TRIGL SERPL-MCNC: 150 MG/DL
UROBILINOGEN URINE: NORMAL
WBC # FLD AUTO: 6.82 K/UL

## 2022-07-19 ENCOUNTER — APPOINTMENT (OUTPATIENT)
Dept: INTERNAL MEDICINE | Facility: CLINIC | Age: 57
End: 2022-07-19

## 2022-07-19 VITALS
WEIGHT: 122 LBS | HEIGHT: 60 IN | SYSTOLIC BLOOD PRESSURE: 122 MMHG | BODY MASS INDEX: 23.95 KG/M2 | HEART RATE: 98 BPM | DIASTOLIC BLOOD PRESSURE: 78 MMHG | OXYGEN SATURATION: 99 %

## 2022-07-19 PROCEDURE — 90471 IMMUNIZATION ADMIN: CPT

## 2022-07-19 PROCEDURE — 99213 OFFICE O/P EST LOW 20 MIN: CPT | Mod: 25

## 2022-07-19 PROCEDURE — 90715 TDAP VACCINE 7 YRS/> IM: CPT

## 2022-07-19 NOTE — HISTORY OF PRESENT ILLNESS
[FreeTextEntry8] : Awoke on July 16, 2022 with pain, tenderness, and swelling in right middle finger 1 day after having a manicure. No fever or chills.

## 2022-07-19 NOTE — HEALTH RISK ASSESSMENT
[No] : In the past 12 months have you used drugs other than those required for medical reasons? No [0] : 2) Feeling down, depressed, or hopeless: Not at all (0) [PHQ-2 Negative - No further assessment needed] : PHQ-2 Negative - No further assessment needed [HIV test declined] : HIV test declined [Audit-CScore] : 0 [[id number]] : 0 [HIVDate] : 07/22

## 2022-07-19 NOTE — REVIEW OF SYSTEMS
[Negative] : Respiratory [Frequency] : frequency [Fever] : no fever [Chills] : no chills [Vision Problems] : no vision problems [Hearing Loss] : no hearing loss [Sore Throat] : no sore throat [Postnasal Drip] : no postnasal drip [Chest Pain] : no chest pain [Palpitations] : no palpitations [Abdominal Pain] : no abdominal pain [Nausea] : no nausea [Vomiting] : no vomiting [Heartburn] : no heartburn [Dysuria] : no dysuria [Hematuria] : no hematuria [Muscle Weakness] : no muscle weakness [Skin Rash] : no skin rash [Headache] : no headache [Dizziness] : no dizziness [Anxiety] : no anxiety [Depression] : no depression [Easy Bleeding] : no easy bleeding [FreeTextEntry2] : +sniffling quite often [FreeTextEntry3] : Last eye exam 4 yrs ago [FreeTextEntry8] : +renal stones

## 2022-07-19 NOTE — PHYSICAL EXAM
[No Acute Distress] : no acute distress [Well Nourished] : well nourished [Well Developed] : well developed [Well-Appearing] : well-appearing [Normal Sclera/Conjunctiva] : normal sclera/conjunctiva [PERRL] : pupils equal round and reactive to light [EOMI] : extraocular movements intact [Normal Outer Ear/Nose] : the outer ears and nose were normal in appearance [Normal Oropharynx] : the oropharynx was normal [No JVD] : no jugular venous distention [No Lymphadenopathy] : no lymphadenopathy [Supple] : supple [Thyroid Normal, No Nodules] : the thyroid was normal and there were no nodules present [No Respiratory Distress] : no respiratory distress  [No Accessory Muscle Use] : no accessory muscle use [Clear to Auscultation] : lungs were clear to auscultation bilaterally [Normal Rate] : normal rate  [Regular Rhythm] : with a regular rhythm [Normal S1, S2] : normal S1 and S2 [No Carotid Bruits] : no carotid bruits [No Abdominal Bruit] : a ~M bruit was not heard ~T in the abdomen [No Varicosities] : no varicosities [Pedal Pulses Present] : the pedal pulses are present [No Edema] : there was no peripheral edema [No Palpable Aorta] : no palpable aorta [No Extremity Clubbing/Cyanosis] : no extremity clubbing/cyanosis [Soft] : abdomen soft [Non Tender] : non-tender [Non-distended] : non-distended [No Masses] : no abdominal mass palpated [No HSM] : no HSM [Normal Bowel Sounds] : normal bowel sounds [Normal Posterior Cervical Nodes] : no posterior cervical lymphadenopathy [Normal Anterior Cervical Nodes] : no anterior cervical lymphadenopathy [No CVA Tenderness] : no CVA  tenderness [No Spinal Tenderness] : no spinal tenderness [No Joint Swelling] : no joint swelling [Grossly Normal Strength/Tone] : grossly normal strength/tone [No Rash] : no rash [Coordination Grossly Intact] : coordination grossly intact [No Focal Deficits] : no focal deficits [Normal Gait] : normal gait [Deep Tendon Reflexes (DTR)] : deep tendon reflexes were 2+ and symmetric [Normal Affect] : the affect was normal [Normal Insight/Judgement] : insight and judgment were intact [de-identified] : 1/6 JIE LUSB sans radiation

## 2022-07-19 NOTE — ASSESSMENT
[FreeTextEntry1] : CPE- healthy person up-to-date on health maintenance measures.  Will order mammograms.  Also up-to-date on recommended vaccines. Urged weight loss through proper diet and regular exercising.\par \par Left lower quadrant abdominal discomfort– I find no abnormality on exam and given the chronicity of it it is very unlikely to be an infection.  I suspect muscular discomfort as it appears to be unrelated to her urinary tract or GI tract. We will check inflammatory markers. Last GYN exam was 10 months ago and was benign.\par \par heart murmur- echocardiogram 7/23/2021 confirmed no structural valvular disease hence benign flow murmur.  EF 65%\par \par Hyperlipidemia- Check lipid profile and liver function tests. Urged low cholesterol diet. Continue medications. LDL goal is less than 130 mg/dl. Last LDL was 170 .  Her ASCVD score is only 3.25% hence will not start therapy currently.\par \par Elevated glucose- D/w patient criteria for prediabetes and diabetes by FBS and A1c. Check labs. \par \par 
stated

## 2022-07-19 NOTE — ASSESSMENT
[FreeTextEntry1] : Paronychia– discussed with patient that this represented an infection and needs to be treated with an antibiotic.  There is no fluctuance to irene.  I discussed that she does have a damaged nail bed producing an irregular nail and she understood.\par \par Risks/benefits of TDAP vaccine d/w patient and patient understands and accepts.

## 2022-07-19 NOTE — PHYSICAL EXAM
[Normal] : no acute distress, well nourished, well developed and well-appearing [de-identified] : Sniffling continually [de-identified] : + Tender right DIP with swelling and erythema at the tip

## 2022-07-19 NOTE — HISTORY OF PRESENT ILLNESS
[FreeTextEntry1] : Here for CPE [de-identified] : Has numerous pains and chronic urinary complaints. Sees gu in [place] and had cystoscopy. + History of renal stones in the past.  Results of that cystoscopy are unknown. Currently, complains of left lower quadrant abdominal pain x greater than 2 years.  Described as a discomfort not pain that does not radiate and is unaccompanied by any other symptoms such as nausea/vomiting.  It occurs more when she squats and lasts minutes.

## 2022-07-25 ENCOUNTER — RESULT REVIEW (OUTPATIENT)
Age: 57
End: 2022-07-25

## 2022-09-21 ENCOUNTER — APPOINTMENT (OUTPATIENT)
Dept: INTERNAL MEDICINE | Facility: CLINIC | Age: 57
End: 2022-09-21

## 2022-09-21 VITALS
HEART RATE: 98 BPM | BODY MASS INDEX: 24.74 KG/M2 | SYSTOLIC BLOOD PRESSURE: 120 MMHG | WEIGHT: 126 LBS | HEIGHT: 60 IN | DIASTOLIC BLOOD PRESSURE: 80 MMHG

## 2022-09-21 PROCEDURE — 99213 OFFICE O/P EST LOW 20 MIN: CPT

## 2022-09-21 NOTE — ASSESSMENT
[FreeTextEntry1] : Paronychia– discussed with patient that this represented an infection and needs to be treated with an antibiotic.  There is no fluctuance to irene.  I discussed that she does have a damaged nail bed producing an irregular nail and she understood.  There is the possibility that she has a fungal infection of the nail for which I referred her to a dermatologist. We will check x-ray of the finger to rule out foreign body.  Risk and benefits of doxycycline discussed with patient.\par \par

## 2022-09-21 NOTE — PLAN
[FreeTextEntry1] : Doxycycline 100 mg twice daily x7 days\par X-ray right finger\par Dermatology consult\par Return as needed

## 2022-09-21 NOTE — HISTORY OF PRESENT ILLNESS
[FreeTextEntry8] : Awoke on July 16, 2022 with pain, tenderness, and swelling in right middle finger 1 day after having a manicure. No fever or chills. Pain got somewhat better after I gave her antibiotic but swelling did not and now pain has returned.

## 2022-09-22 ENCOUNTER — RESULT REVIEW (OUTPATIENT)
Age: 57
End: 2022-09-22

## 2022-09-30 ENCOUNTER — NON-APPOINTMENT (OUTPATIENT)
Age: 57
End: 2022-09-30

## 2022-09-30 RX ORDER — DOXYCYCLINE HYCLATE 100 MG/1
100 CAPSULE ORAL
Qty: 14 | Refills: 0 | Status: DISCONTINUED | COMMUNITY
Start: 2022-09-21 | End: 2022-09-30

## 2022-10-04 ENCOUNTER — APPOINTMENT (OUTPATIENT)
Dept: INTERNAL MEDICINE | Facility: CLINIC | Age: 57
End: 2022-10-04

## 2022-10-04 VITALS
WEIGHT: 126 LBS | HEART RATE: 76 BPM | HEIGHT: 62 IN | DIASTOLIC BLOOD PRESSURE: 70 MMHG | BODY MASS INDEX: 23.19 KG/M2 | SYSTOLIC BLOOD PRESSURE: 100 MMHG

## 2022-10-04 PROCEDURE — 99496 TRANSJ CARE MGMT HIGH F2F 7D: CPT | Mod: 25

## 2022-10-04 PROCEDURE — 36415 COLL VENOUS BLD VENIPUNCTURE: CPT

## 2022-10-04 NOTE — PHYSICAL EXAM
[Normal] : no acute distress, well nourished, well developed and well-appearing [Normal Axillary Nodes] : no axillary lymphadenopathy [de-identified] : Sniffling continually [de-identified] : + Minimally tender right DIP with swelling but no erythema at the tip; nail is damaged

## 2022-10-04 NOTE — ASSESSMENT
[FreeTextEntry1] : Osteomyelitis of finger– clinically doing better. She has follow-up with infectious disease next week.  Emphasized the importance of compliance with levofloxacin.\par \par

## 2022-10-04 NOTE — HISTORY OF PRESENT ILLNESS
[Post-hospitalization from ___ Hospital] : Post-hospitalization from [unfilled] Hospital [Admitted on: ___] : The patient was admitted on [unfilled] [Discharged on ___] : discharged on [unfilled] [Discharge Summary] : discharge summary [Pertinent Labs] : pertinent labs [Radiology Findings] : radiology findings [Discharge Med List] : discharge medication list [Other: ____] : [unfilled] [Patient Contacted By: ____] : and contacted by [unfilled] [FreeTextEntry2] : Had osteomyelitis right third DIP treated with iv antibiotics then changed to oral levofloxin.  Finger still slightly swollen but significantly less tender than previously.

## 2022-10-05 LAB
ALBUMIN SERPL ELPH-MCNC: 4.5 G/DL
ALP BLD-CCNC: 84 U/L
ALT SERPL-CCNC: 22 U/L
ANION GAP SERPL CALC-SCNC: 12 MMOL/L
AST SERPL-CCNC: 20 U/L
BASOPHILS # BLD AUTO: 0.04 K/UL
BASOPHILS NFR BLD AUTO: 0.6 %
BILIRUB SERPL-MCNC: 0.4 MG/DL
BUN SERPL-MCNC: 15 MG/DL
CALCIUM SERPL-MCNC: 9.6 MG/DL
CHLORIDE SERPL-SCNC: 103 MMOL/L
CO2 SERPL-SCNC: 24 MMOL/L
CREAT SERPL-MCNC: 0.48 MG/DL
CRP SERPL-MCNC: 5 MG/L
EGFR: 112 ML/MIN/1.73M2
EOSINOPHIL # BLD AUTO: 0.12 K/UL
EOSINOPHIL NFR BLD AUTO: 1.8 %
ERYTHROCYTE [SEDIMENTATION RATE] IN BLOOD BY WESTERGREN METHOD: 39 MM/HR
GLUCOSE SERPL-MCNC: 101 MG/DL
HCT VFR BLD CALC: 38.1 %
HGB BLD-MCNC: 12.4 G/DL
IMM GRANULOCYTES NFR BLD AUTO: 0.1 %
LYMPHOCYTES # BLD AUTO: 2.91 K/UL
LYMPHOCYTES NFR BLD AUTO: 42.7 %
MAN DIFF?: NORMAL
MCHC RBC-ENTMCNC: 30.2 PG
MCHC RBC-ENTMCNC: 32.5 GM/DL
MCV RBC AUTO: 92.9 FL
MONOCYTES # BLD AUTO: 0.47 K/UL
MONOCYTES NFR BLD AUTO: 6.9 %
NEUTROPHILS # BLD AUTO: 3.27 K/UL
NEUTROPHILS NFR BLD AUTO: 47.9 %
PLATELET # BLD AUTO: 298 K/UL
POTASSIUM SERPL-SCNC: 4.2 MMOL/L
PROT SERPL-MCNC: 6.8 G/DL
RBC # BLD: 4.1 M/UL
RBC # FLD: 13.2 %
SODIUM SERPL-SCNC: 139 MMOL/L
WBC # FLD AUTO: 6.82 K/UL

## 2022-10-10 ENCOUNTER — TRANSCRIPTION ENCOUNTER (OUTPATIENT)
Age: 57
End: 2022-10-10

## 2023-01-10 ENCOUNTER — APPOINTMENT (OUTPATIENT)
Dept: OBGYN | Facility: CLINIC | Age: 58
End: 2023-01-10
Payer: COMMERCIAL

## 2023-01-10 VITALS
BODY MASS INDEX: 23.19 KG/M2 | HEIGHT: 62 IN | WEIGHT: 126 LBS | DIASTOLIC BLOOD PRESSURE: 72 MMHG | SYSTOLIC BLOOD PRESSURE: 100 MMHG

## 2023-01-10 PROCEDURE — 99396 PREV VISIT EST AGE 40-64: CPT

## 2023-01-10 NOTE — PLAN
[FreeTextEntry1] : To urogyn for vaginal repair/ assessment first.  Pt agrees.  Gave info for Svetlana Muñoz.

## 2023-01-10 NOTE — PHYSICAL EXAM
[Chaperone Declined] : Patient declined chaperone [Appropriately responsive] : appropriately responsive [Alert] : alert [No Acute Distress] : no acute distress [No Lymphadenopathy] : no lymphadenopathy [Soft] : soft [Non-tender] : non-tender [Non-distended] : non-distended [No HSM] : No HSM [No Lesions] : no lesions [No Mass] : no mass [Oriented x3] : oriented x3 [Examination Of The Breasts] : a normal appearance [No Masses] : no breast masses were palpable [Labia Majora] : normal [Labia Minora] : normal [Atrophy] : atrophy [Cystocele] : a cystocele [Normal] : normal [Uterine Adnexae] : normal [FreeTextEntry4] : 3+ [FreeTextEntry6] : utx descent about 2+

## 2023-01-10 NOTE — HISTORY OF PRESENT ILLNESS
[TextBox_4] : 55 year old [place]an-speaking postmenopausal P3 for annual exam. \par Today, she c/o pressure/ discomfort when she touches externally down there.  She denies current leakage of urine but sees a urologist and started on Detrol to help with urge.  Also h/o kidney stone/ lithotripsy.\par Wants to address the vaginal/ vulvar discomfort.\par  No postmenopausal bleeding. Sexually active without problems. \par \par She is ; currently not working b/c of shoulder surgery.  Used to take care of disabled adults.  She is .  SHe has 3 children in their 30s.

## 2023-01-30 LAB
CANDIDA VAG CYTO: NOT DETECTED
CYTOLOGY CVX/VAG DOC THIN PREP: ABNORMAL
G VAGINALIS+PREV SP MTYP VAG QL MICRO: NOT DETECTED
HPV HIGH+LOW RISK DNA PNL CVX: NOT DETECTED
T VAGINALIS VAG QL WET PREP: NOT DETECTED

## 2023-03-14 NOTE — ASSESSMENT
[FreeTextEntry1] : Patient due for screening colonoscopy. \par Risks (including but not limited to sedation risks, infection, bleeding, perforation, possibility of missed lesions), benefits and alternatives to procedure, including not doing the procedure, were discussed with patient. Patient understood and agreed to proceed with colonoscopy. \par Colonoscopy preparation instructions reviewed with patient.\par 2 Dulcolax two days prior to procedure + Split MiraLAX/Dulcolax preparation starting day prior to procedure\par  Olanzapine Pregnancy And Lactation Text: This medication is pregnancy category C.   There are no adequate and well controlled trials with olanzapine in pregnant females.  Olanzapine should be used during pregnancy only if the potential benefit justifies the potential risk to the fetus.   In a study in lactating healthy women, olanzapine was excreted in breast milk.  It is recommended that women taking olanzapine should not breast feed.

## 2023-03-15 ENCOUNTER — APPOINTMENT (OUTPATIENT)
Dept: INTERNAL MEDICINE | Facility: CLINIC | Age: 58
End: 2023-03-15
Payer: COMMERCIAL

## 2023-03-15 ENCOUNTER — LABORATORY RESULT (OUTPATIENT)
Age: 58
End: 2023-03-15

## 2023-03-15 VITALS
WEIGHT: 124 LBS | HEART RATE: 88 BPM | HEIGHT: 62 IN | DIASTOLIC BLOOD PRESSURE: 70 MMHG | BODY MASS INDEX: 22.82 KG/M2 | SYSTOLIC BLOOD PRESSURE: 110 MMHG

## 2023-03-15 PROCEDURE — 99214 OFFICE O/P EST MOD 30 MIN: CPT | Mod: 25

## 2023-03-15 NOTE — ASSESSMENT
[FreeTextEntry1] : Rash– given its distribution I discussed with patient that this is likely a contact dermatitis as there are no lesions below the ankles nor above the knees or anywhere else on her body. Hence it appears that when she was wearing shorts when they her lower legs were exposed to the fending agent.  Currently the lesions are not acutely inflamed however I will give her a 4-day course of tapering prednisone and informed her that the lesions would remain hyperpigmented for possibly several weeks but that they would eventually begin to fade.\par \par Her abdominal complaints are chronic and not new for many years.  We will check labs. \par \par Her osteomyelitis has apparently been treated.  We will check inflammatory markers now.

## 2023-03-15 NOTE — PHYSICAL EXAM
[Normal] : no acute distress, well nourished, well developed and well-appearing [de-identified] : + No warmth, erythema, tenderness, or swelling of right 3rd finger DIP; nail is damaged [de-identified] : No warmth, erythema, tenderness, or swelling of both lower legs with some mild postinflammatory hyperpigmentation; no vesicles or papules

## 2023-03-15 NOTE — HISTORY OF PRESENT ILLNESS
[FreeTextEntry8] : She made this appointment specifically for a rash on both lower legs x 15 days but now also complains of recurrent lower abdominal pain x2 months. Also wants her previously infected finger rechecked. +itchy rash in beginning but took benadryl and cortisone cream with some improvement of the rash. She still has some discoloration where the rash was. No vesicles, fever.

## 2023-03-15 NOTE — REVIEW OF SYSTEMS
[Abdominal Pain] : abdominal pain [Skin Rash] : skin rash [Fever] : no fever [Nausea] : no nausea [Vomiting] : no vomiting [Dysuria] : no dysuria

## 2023-03-16 LAB
ALBUMIN SERPL ELPH-MCNC: 4.9 G/DL
ALP BLD-CCNC: 86 U/L
ALT SERPL-CCNC: 18 U/L
AMYLASE/CREAT SERPL: 85 U/L
ANION GAP SERPL CALC-SCNC: 17 MMOL/L
AST SERPL-CCNC: 18 U/L
BASOPHILS # BLD AUTO: 0.04 K/UL
BASOPHILS NFR BLD AUTO: 0.5 %
BILIRUB SERPL-MCNC: 0.4 MG/DL
BUN SERPL-MCNC: 11 MG/DL
CALCIUM SERPL-MCNC: 10 MG/DL
CHLORIDE SERPL-SCNC: 105 MMOL/L
CO2 SERPL-SCNC: 21 MMOL/L
CREAT SERPL-MCNC: 0.47 MG/DL
CRP SERPL-MCNC: 3 MG/L
EGFR: 112 ML/MIN/1.73M2
EOSINOPHIL # BLD AUTO: 0.08 K/UL
EOSINOPHIL NFR BLD AUTO: 1 %
ERYTHROCYTE [SEDIMENTATION RATE] IN BLOOD BY WESTERGREN METHOD: 42 MM/HR
GLUCOSE SERPL-MCNC: 93 MG/DL
HCT VFR BLD CALC: 41.1 %
HGB BLD-MCNC: 13 G/DL
IMM GRANULOCYTES NFR BLD AUTO: 0.1 %
LYMPHOCYTES # BLD AUTO: 3.52 K/UL
LYMPHOCYTES NFR BLD AUTO: 43.3 %
MAN DIFF?: NORMAL
MCHC RBC-ENTMCNC: 29.5 PG
MCHC RBC-ENTMCNC: 31.6 GM/DL
MCV RBC AUTO: 93.2 FL
MONOCYTES # BLD AUTO: 0.53 K/UL
MONOCYTES NFR BLD AUTO: 6.5 %
NEUTROPHILS # BLD AUTO: 3.95 K/UL
NEUTROPHILS NFR BLD AUTO: 48.6 %
PLATELET # BLD AUTO: 284 K/UL
POTASSIUM SERPL-SCNC: 4.1 MMOL/L
PROT SERPL-MCNC: 7.4 G/DL
RBC # BLD: 4.41 M/UL
RBC # FLD: 13.2 %
SODIUM SERPL-SCNC: 143 MMOL/L
WBC # FLD AUTO: 8.13 K/UL

## 2023-03-20 LAB
HCV AB SER QL: ABNORMAL
HCV S/CO RATIO: 1.09 S/CO

## 2023-06-12 ENCOUNTER — APPOINTMENT (OUTPATIENT)
Dept: INTERNAL MEDICINE | Facility: CLINIC | Age: 58
End: 2023-06-12
Payer: MEDICAID

## 2023-06-12 VITALS
HEIGHT: 62 IN | SYSTOLIC BLOOD PRESSURE: 110 MMHG | BODY MASS INDEX: 23.19 KG/M2 | HEART RATE: 99 BPM | DIASTOLIC BLOOD PRESSURE: 80 MMHG | WEIGHT: 126 LBS

## 2023-06-12 PROCEDURE — 99213 OFFICE O/P EST LOW 20 MIN: CPT

## 2023-06-12 NOTE — ASSESSMENT
[FreeTextEntry1] : Left wrist pain– no evidence of inflammatory process noted. Risk and benefits of diclofenac discussed with patient and she understands she will take it twice a day after meals. I recommended she also use a splint so as not to overuse the wrist.\par \par I discussed her chronic left lower quadrant pain emphasizing that it is likely musculoskeletal as it only occurs with movement.

## 2023-06-12 NOTE — HISTORY OF PRESENT ILLNESS
[FreeTextEntry8] : 2-3 weeks of left wrist pain, constant but comes and goes too(?), worse with movement. Pain 7/10 now but 9/10. No radiation of pain. No trauma noted. But off and running with numerous other complains most notably LLQ pain when bends over.

## 2023-06-12 NOTE — PHYSICAL EXAM
[Normal] : no acute distress, well nourished, well developed and well-appearing [de-identified] : No warmth, erythema, swelling of left wrist; some tenderness medially but full range of motion/strength; negative Tinel's sign

## 2023-06-13 RX ORDER — IBUPROFEN 600 MG/1
600 TABLET, FILM COATED ORAL TWICE DAILY
Qty: 30 | Refills: 0 | Status: ACTIVE | COMMUNITY
Start: 2023-06-13 | End: 1900-01-01

## 2023-07-27 ENCOUNTER — APPOINTMENT (OUTPATIENT)
Dept: INTERNAL MEDICINE | Facility: CLINIC | Age: 58
End: 2023-07-27
Payer: MEDICAID

## 2023-07-27 ENCOUNTER — NON-APPOINTMENT (OUTPATIENT)
Age: 58
End: 2023-07-27

## 2023-07-27 ENCOUNTER — RESULT REVIEW (OUTPATIENT)
Age: 58
End: 2023-07-27

## 2023-07-27 VITALS
WEIGHT: 122 LBS | HEIGHT: 62 IN | BODY MASS INDEX: 22.45 KG/M2 | DIASTOLIC BLOOD PRESSURE: 70 MMHG | SYSTOLIC BLOOD PRESSURE: 130 MMHG | HEART RATE: 74 BPM

## 2023-07-27 DIAGNOSIS — Z80.9 FAMILY HISTORY OF MALIGNANT NEOPLASM, UNSPECIFIED: ICD-10-CM

## 2023-07-27 DIAGNOSIS — M25.532 PAIN IN LEFT WRIST: ICD-10-CM

## 2023-07-27 DIAGNOSIS — E78.2 MIXED HYPERLIPIDEMIA: ICD-10-CM

## 2023-07-27 DIAGNOSIS — R70.0 ELEVATED ERYTHROCYTE SEDIMENTATION RATE: ICD-10-CM

## 2023-07-27 DIAGNOSIS — M79.605 PAIN IN RIGHT LEG: ICD-10-CM

## 2023-07-27 DIAGNOSIS — J30.1 ALLERGIC RHINITIS DUE TO POLLEN: ICD-10-CM

## 2023-07-27 DIAGNOSIS — E55.9 VITAMIN D DEFICIENCY, UNSPECIFIED: ICD-10-CM

## 2023-07-27 DIAGNOSIS — M79.604 PAIN IN RIGHT LEG: ICD-10-CM

## 2023-07-27 DIAGNOSIS — Z00.00 ENCOUNTER FOR GENERAL ADULT MEDICAL EXAMINATION W/OUT ABNORMAL FINDINGS: ICD-10-CM

## 2023-07-27 PROCEDURE — 99396 PREV VISIT EST AGE 40-64: CPT | Mod: 25

## 2023-07-27 PROCEDURE — 99214 OFFICE O/P EST MOD 30 MIN: CPT | Mod: 25

## 2023-07-27 PROCEDURE — 93000 ELECTROCARDIOGRAM COMPLETE: CPT

## 2023-07-27 NOTE — HEALTH RISK ASSESSMENT
[No] : In the past 12 months have you used drugs other than those required for medical reasons? No [0] : 2) Feeling down, depressed, or hopeless: Not at all (0) [PHQ-2 Negative - No further assessment needed] : PHQ-2 Negative - No further assessment needed [HIV test declined] : HIV test declined [Audit-CScore] : 0 [[id number]] : 0 [HIVDate] : 07/23 [Never] : Never

## 2023-07-27 NOTE — REVIEW OF SYSTEMS
[Frequency] : frequency [Negative] : Respiratory [Depression] : depression [Fever] : no fever [Chills] : no chills [Vision Problems] : no vision problems [Hearing Loss] : no hearing loss [Sore Throat] : no sore throat [Postnasal Drip] : no postnasal drip [Chest Pain] : no chest pain [Palpitations] : no palpitations [Abdominal Pain] : no abdominal pain [Nausea] : no nausea [Vomiting] : no vomiting [Heartburn] : no heartburn [Dysuria] : no dysuria [Hematuria] : no hematuria [Muscle Weakness] : no muscle weakness [Skin Rash] : no skin rash [Headache] : no headache [Dizziness] : no dizziness [Anxiety] : no anxiety [Easy Bleeding] : no easy bleeding [FreeTextEntry2] : +sniffling quite often [FreeTextEntry3] : Last eye exam 4 yrs ago [FreeTextEntry8] : +renal stones [de-identified] : Numbness, cold, unstable legs x15 to 20 years

## 2023-07-27 NOTE — ASSESSMENT
[FreeTextEntry1] : CPE- healthy person up-to-date on health maintenance measures.  Also up-to-date on recommended vaccines. \par \par Somatic disorder-unclear if her numerous diverse pain syndromes are related to her elevated ESR but I doubt it as most of her symptoms are many years old and her ESR was as low as 23 in 2021. There have been no inflammatory changes noted on her physical exam.  She does admit to occasionally feeling depressed for many years and hence may benefit from an SSRI to see if she feels better. Will defer for now but readdressed with patient later.  I recommended she continue taking Advil or Tylenol as directed.\par \par heart murmur- echocardiogram 7/23/2021 confirmed no structural valvular disease hence benign flow murmur.  EF 65%\par \par Hyperlipidemia- Check lipid profile and liver function tests. Urged low cholesterol diet. Continue medications. LDL goal is less than 130 mg/dl. Last LDL was 177 .  Her ASCVD score is only 2.99 % hence will not start therapy currently.\par \par Allergic rhinitis– I discussed with her at length that her chronic sniffling noted today and last year at her physical are likely indicative of rhinitis or an inflammatory process irritating the lining of her head. We discussed various over-the-counter medications she could try in the following order: Loratadine 10 mg daily, fexofenadine 180 mg daily, or Zyrtec 10 mg daily.  I provided her with a list of these medications which she can try.\par \par Declines HIV testing\par

## 2023-07-27 NOTE — HISTORY OF PRESENT ILLNESS
[FreeTextEntry1] : Here for CPE\par has been taking tolterine for >3 yrs given to her by her urologist Dr. Dodd [de-identified] : Once again, often running with numerous pains/symptoms and chronic urinary complaints. Sees gu in [place] and had cystoscopy. + History of renal stones in the past. Currently, complains of left wrist pain as before but now involves the entire arm. She also complains of discomfort in her mouth for several years. No difficulty swallowing or vomiting.  She also complains of her feet feeling cold for more than 10 years.  In addition she feels her feet are imbalanced for the last 15 years.  Both the legs have been feeling numb for the last 20 years.

## 2023-07-27 NOTE — PHYSICAL EXAM
[No Acute Distress] : no acute distress [Well Nourished] : well nourished [Well Developed] : well developed [Well-Appearing] : well-appearing [Normal Sclera/Conjunctiva] : normal sclera/conjunctiva [PERRL] : pupils equal round and reactive to light [EOMI] : extraocular movements intact [Normal Outer Ear/Nose] : the outer ears and nose were normal in appearance [Normal Oropharynx] : the oropharynx was normal [No JVD] : no jugular venous distention [No Lymphadenopathy] : no lymphadenopathy [Supple] : supple [Thyroid Normal, No Nodules] : the thyroid was normal and there were no nodules present [No Respiratory Distress] : no respiratory distress  [No Accessory Muscle Use] : no accessory muscle use [Clear to Auscultation] : lungs were clear to auscultation bilaterally [Normal Rate] : normal rate  [Regular Rhythm] : with a regular rhythm [Normal S1, S2] : normal S1 and S2 [No Carotid Bruits] : no carotid bruits [No Abdominal Bruit] : a ~M bruit was not heard ~T in the abdomen [No Varicosities] : no varicosities [Pedal Pulses Present] : the pedal pulses are present [No Edema] : there was no peripheral edema [No Palpable Aorta] : no palpable aorta [No Extremity Clubbing/Cyanosis] : no extremity clubbing/cyanosis [Soft] : abdomen soft [Non Tender] : non-tender [Non-distended] : non-distended [No Masses] : no abdominal mass palpated [No HSM] : no HSM [Normal Bowel Sounds] : normal bowel sounds [No CVA Tenderness] : no CVA  tenderness [No Spinal Tenderness] : no spinal tenderness [No Joint Swelling] : no joint swelling [Grossly Normal Strength/Tone] : grossly normal strength/tone [No Rash] : no rash [Coordination Grossly Intact] : coordination grossly intact [No Focal Deficits] : no focal deficits [Normal Gait] : normal gait [Deep Tendon Reflexes (DTR)] : deep tendon reflexes were 2+ and symmetric [Normal Affect] : the affect was normal [Normal Insight/Judgement] : insight and judgment were intact [de-identified] : 1/6 JIE LUSB sans radiation

## 2023-07-30 LAB
25(OH)D3 SERPL-MCNC: 19.3 NG/ML
ALBUMIN SERPL ELPH-MCNC: 4.9 G/DL
ALP BLD-CCNC: 82 U/L
ALT SERPL-CCNC: 21 U/L
ANION GAP SERPL CALC-SCNC: 11 MMOL/L
APPEARANCE: CLEAR
AST SERPL-CCNC: 19 U/L
BILIRUB SERPL-MCNC: 0.6 MG/DL
BILIRUBIN URINE: NEGATIVE
BLOOD URINE: NEGATIVE
BUN SERPL-MCNC: 13 MG/DL
CALCIUM SERPL-MCNC: 9.6 MG/DL
CHLORIDE SERPL-SCNC: 105 MMOL/L
CHOLEST SERPL-MCNC: 286 MG/DL
CO2 SERPL-SCNC: 23 MMOL/L
COLOR: YELLOW
CREAT SERPL-MCNC: 0.48 MG/DL
EGFR: 112 ML/MIN/1.73M2
ERYTHROCYTE [SEDIMENTATION RATE] IN BLOOD BY WESTERGREN METHOD: 30 MM/HR
GLUCOSE QUALITATIVE U: NEGATIVE MG/DL
GLUCOSE SERPL-MCNC: 106 MG/DL
HDLC SERPL-MCNC: 48 MG/DL
KETONES URINE: NEGATIVE MG/DL
LDLC SERPL CALC-MCNC: 206 MG/DL
LEUKOCYTE ESTERASE URINE: NEGATIVE
NITRITE URINE: NEGATIVE
NONHDLC SERPL-MCNC: 238 MG/DL
PH URINE: 6
POTASSIUM SERPL-SCNC: 4.1 MMOL/L
PROT SERPL-MCNC: 7.2 G/DL
PROTEIN URINE: NEGATIVE MG/DL
SODIUM SERPL-SCNC: 139 MMOL/L
SPECIFIC GRAVITY URINE: 1.02
TRIGL SERPL-MCNC: 171 MG/DL
TSH SERPL-ACNC: 1.8 UIU/ML
UROBILINOGEN URINE: 0.2 MG/DL

## 2023-10-07 NOTE — PHYSICAL EXAM
[Normal] : no acute distress, well nourished, well developed and well-appearing [de-identified] : Sniffling continually [de-identified] : + Tender right DIP with swelling and erythema at the tip; nail is damaged No

## 2024-01-02 ENCOUNTER — APPOINTMENT (OUTPATIENT)
Dept: INTERNAL MEDICINE | Facility: CLINIC | Age: 59
End: 2024-01-02
Payer: MEDICAID

## 2024-01-02 VITALS
OXYGEN SATURATION: 98 % | DIASTOLIC BLOOD PRESSURE: 80 MMHG | TEMPERATURE: 98.1 F | BODY MASS INDEX: 22.82 KG/M2 | HEART RATE: 68 BPM | SYSTOLIC BLOOD PRESSURE: 116 MMHG | RESPIRATION RATE: 16 BRPM | HEIGHT: 62 IN | WEIGHT: 124 LBS

## 2024-01-02 DIAGNOSIS — J06.9 ACUTE UPPER RESPIRATORY INFECTION, UNSPECIFIED: ICD-10-CM

## 2024-01-02 PROCEDURE — 99213 OFFICE O/P EST LOW 20 MIN: CPT

## 2024-01-02 RX ORDER — SOLIFENACIN SUCCINATE 10 MG/1
10 TABLET ORAL
Refills: 0 | Status: ACTIVE | COMMUNITY
Start: 2024-01-02

## 2024-01-02 NOTE — ASSESSMENT
[FreeTextEntry1] : URI- Suspect viral. Urged to increase oral fluids, take OTC acetaminophen/ibuprofen as directed.  Hold antibiotic for now. May check chest x-ray if no better. Risks/benefit of benzonate and prednisone discussed with patient.

## 2024-01-02 NOTE — HISTORY OF PRESENT ILLNESS
[FreeTextEntry8] : URI x 14 days The cough is productive of clear, scant sputum.  Associated symptoms include severe sore throat , headaches, and fatigue.  The patient denies shortness of breath, fever, hemoptysis, dyspnea on exertion, and night sweats.  There is no history of asthma or chronic pulmonary disease.

## 2024-01-02 NOTE — PHYSICAL EXAM
[No JVD] : no jugular venous distention [No Lymphadenopathy] : no lymphadenopathy [Normal] : no respiratory distress, lungs were clear to auscultation bilaterally and no accessory muscle use [Normal Rate] : normal rate  [Regular Rhythm] : with a regular rhythm [de-identified] : + Severe diffuse erythema without exudate or vesicles

## 2024-01-02 NOTE — REVIEW OF SYSTEMS
[Fever] : no fever [Chills] : no chills [Fatigue] : fatigue [Shortness Of Breath] : no shortness of breath [Wheezing] : no wheezing [Cough] : cough [Dyspnea on Exertion] : no dyspnea on exertion

## 2024-02-26 ENCOUNTER — APPOINTMENT (OUTPATIENT)
Dept: INTERNAL MEDICINE | Facility: CLINIC | Age: 59
End: 2024-02-26
Payer: MEDICAID

## 2024-02-26 VITALS — BODY MASS INDEX: 22.5 KG/M2 | WEIGHT: 123 LBS

## 2024-02-26 VITALS
TEMPERATURE: 98.8 F | HEIGHT: 62 IN | SYSTOLIC BLOOD PRESSURE: 120 MMHG | OXYGEN SATURATION: 97 % | HEART RATE: 74 BPM | WEIGHT: 124 LBS | BODY MASS INDEX: 22.82 KG/M2 | DIASTOLIC BLOOD PRESSURE: 80 MMHG | RESPIRATION RATE: 16 BRPM

## 2024-02-26 DIAGNOSIS — R19.7 DIARRHEA, UNSPECIFIED: ICD-10-CM

## 2024-02-26 PROCEDURE — G2211 COMPLEX E/M VISIT ADD ON: CPT | Mod: NC,1L

## 2024-02-26 PROCEDURE — 99213 OFFICE O/P EST LOW 20 MIN: CPT

## 2024-02-26 RX ORDER — VIBEGRON 75 MG/1
75 TABLET, FILM COATED ORAL
Refills: 0 | Status: ACTIVE | COMMUNITY

## 2024-02-26 NOTE — HISTORY OF PRESENT ILLNESS
[FreeTextEntry8] : 1 week of bloating, mildly loose stools 2-3 days. No recent travel, took Pepto-Bismol sans effect. No fever, n/v, BRBPR.  No one in the household similarly ill. Notable is that she started a new medication Gemtesa approximately 2 weeks ago with one of the adverse effects being listed is diarrhea.

## 2024-02-26 NOTE — REVIEW OF SYSTEMS
[Fever] : no fever [Chills] : no chills [Abdominal Pain] : no abdominal pain [Nausea] : no nausea [Vomiting] : no vomiting [Diarrhea] : diarrhea [Heartburn] : no heartburn

## 2024-02-26 NOTE — ASSESSMENT
[FreeTextEntry1] : Mild diarrhea- I strongly suspect her medication to be the culprit of her current symptoms although she may circumstantially have IBS. I recommended she try Imodium A-D as directed today by me. I have asked her to hold her Gemtesa for 2 weeks and then restart it to see if symptoms recur.

## 2024-05-30 ENCOUNTER — APPOINTMENT (OUTPATIENT)
Dept: OBGYN | Facility: CLINIC | Age: 59
End: 2024-05-30
Payer: MEDICAID

## 2024-05-30 VITALS
WEIGHT: 122 LBS | HEIGHT: 62 IN | BODY MASS INDEX: 22.45 KG/M2 | SYSTOLIC BLOOD PRESSURE: 138 MMHG | DIASTOLIC BLOOD PRESSURE: 80 MMHG

## 2024-05-30 DIAGNOSIS — N81.10 CYSTOCELE, UNSPECIFIED: ICD-10-CM

## 2024-05-30 DIAGNOSIS — Z01.419 ENCOUNTER FOR GYNECOLOGICAL EXAMINATION (GENERAL) (ROUTINE) W/OUT ABNORMAL FINDINGS: ICD-10-CM

## 2024-05-30 DIAGNOSIS — Z12.31 ENCOUNTER FOR SCREENING MAMMOGRAM FOR MALIGNANT NEOPLASM OF BREAST: ICD-10-CM

## 2024-05-30 DIAGNOSIS — N39.3 STRESS INCONTINENCE (FEMALE) (MALE): ICD-10-CM

## 2024-05-30 DIAGNOSIS — R92.30 DENSE BREASTS, UNSPECIFIED: ICD-10-CM

## 2024-05-30 PROCEDURE — 99459 PELVIC EXAMINATION: CPT

## 2024-05-30 PROCEDURE — 99396 PREV VISIT EST AGE 40-64: CPT

## 2024-05-30 NOTE — ASSESSMENT
[TextEntry] : Increasingly symptomatic POP w/ cystocele, predominantly. Mngmnt options d/w pt who would prefer surgical repair.

## 2024-05-30 NOTE — PHYSICAL EXAM
[Chaperone Present] : A chaperone was present in the examining room during all aspects of the physical examination [No Lymphadenopathy] : no lymphadenopathy [Soft] : soft [Non-tender] : non-tender [No HSM] : No HSM [No Mass] : no mass [Nl Sphincter Tone] : normal sphincter tone [15796] : A chaperone was present during the pelvic exam. [FreeTextEntry2] : Jennyfer [FreeTextEntry3] : No thyroid nodules [Vulvar Atrophy] : vulvar atrophy [Atrophy] : atrophy [Cystocele] : a cystocele [Normal] : normal [Retroversion] : retroverted [Enlarged ___ wks] : not enlarged [FreeTextEntry4] : protrudes ~1 cm past introitus w/ Valsalva. Able to contract levator muscles - weakly to moderately strong contraction [FreeTextEntry5] : Pap done. Mild prolapse [FreeTextEntry9] : No masses; small rectocele

## 2024-05-30 NOTE — HISTORY OF PRESENT ILLNESS
[TextBox_4] : (NVD's) LMP at age 52 for gynecologic care. Feels well. No gyn complaints. Normal bladder & bowel function. Patient is  and was not working when seen in 2023 because of shoulder surgery.  Used to care for disabled adults. 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 Something is protruding out. It is bothersome, azael when sitting. Not able to void every time she feels that she needs to. She does not push the prolapsing mass back in. Sometimes has USI. Reports she is a little bit wet when she awakes at night. No bleeding. Sometimes has burning w/ urination. Voids 2-3 x/nt. She saw a Urologist in [place] who prescribed a pill that she takes once daily. [Mammogramdate] : 7/25/2022 [TextBox_19] : Heterogeneously dense; negative. [Menopause Age: ____] : age at menopause was [unfilled]

## 2024-06-05 LAB
CYTOLOGY CVX/VAG DOC THIN PREP: NORMAL
HPV HIGH+LOW RISK DNA PNL CVX: NOT DETECTED

## 2024-06-17 ENCOUNTER — APPOINTMENT (OUTPATIENT)
Dept: GASTROENTEROLOGY | Facility: CLINIC | Age: 59
End: 2024-06-17
Payer: MEDICAID

## 2024-06-17 VITALS
WEIGHT: 122 LBS | DIASTOLIC BLOOD PRESSURE: 75 MMHG | BODY MASS INDEX: 22.45 KG/M2 | HEART RATE: 68 BPM | HEIGHT: 62 IN | OXYGEN SATURATION: 97 % | SYSTOLIC BLOOD PRESSURE: 135 MMHG

## 2024-06-17 DIAGNOSIS — R19.4 CHANGE IN BOWEL HABIT: ICD-10-CM

## 2024-06-17 PROCEDURE — 99204 OFFICE O/P NEW MOD 45 MIN: CPT

## 2024-06-17 PROCEDURE — G2211 COMPLEX E/M VISIT ADD ON: CPT | Mod: NC,1L

## 2024-06-17 NOTE — PHYSICAL EXAM
[Alert] : alert [Normal Voice/Communication] : normal voice/communication [Healthy Appearing] : healthy appearing [No Acute Distress] : no acute distress [Sclera] : the sclera and conjunctiva were normal [Hearing Threshold Finger Rub Not Giles] : hearing was normal [Normal Lips/Gums] : the lips and gums were normal [Normal Appearance] : the appearance of the neck was normal [Oropharynx] : the oropharynx was normal [No Neck Mass] : no neck mass was observed [No Respiratory Distress] : no respiratory distress [No Acc Muscle Use] : no accessory muscle use [Respiration, Rhythm And Depth] : normal respiratory rhythm and effort [Auscultation Breath Sounds / Voice Sounds] : lungs were clear to auscultation bilaterally [Heart Rate And Rhythm] : heart rate was normal and rhythm regular [Normal S1, S2] : normal S1 and S2 [Murmurs] : no murmurs [Bowel Sounds] : normal bowel sounds [Abdomen Tenderness] : non-tender [No Masses] : no abdominal mass palpated [Abdomen Soft] : soft [] : no hepatosplenomegaly [Oriented To Time, Place, And Person] : oriented to person, place, and time [Normal Sphincter Tone] : normal sphincter tone [No Rectal Mass] : no rectal mass [Occult Blood] : negative occult blood [de-identified] : 1 small skin tag noted; not inflamed. neg guaic.

## 2024-06-17 NOTE — HISTORY OF PRESENT ILLNESS
[FreeTextEntry1] : (08/02/2020) Dr. Pearl---1 benign polyp, non bleeding IH. ( Recommended repeat colonoscopy in 7 yrs)

## 2024-06-17 NOTE — ASSESSMENT
[FreeTextEntry1] : # GERD 1. Reviewed Lifestyle and dietary modifications  -Encouraged Weight loss with aerobic exercises 30 min a day -Eliminating and or identifying dietary triggers (Alcohol, caffeine, chocolate, spicy foods, food with high fat content, carbonated beverages, Dairy, peppermint, Licorice ect. )  -sitting upright after meals, avoiding meals 3-4 hrs before bed, Bed wedge when sleeping  -Keep a food diary to identify trigger foods.  2. Acid suppression medications as prescribed. 3. Due to recent travel outside the [place] will r/o H. pylori with stool testing   # Altered bowel habits  1. Will order stool studies to r/o any infection Vs inflammation Vs viral Vs. Malabsorption condition. Labs to r/o any metabolic Vs celiac disease.  2. Cannot exclude loose stools and then hard stool as an overflow phenomenon therefore high-water intake (40 oz/day) with high fiber diet and fiber OTC discussed to help regulate her bowels.  3. Encouraged to keep a food diary to identify any triggering foods  4. Further treatment pending above's findings.   OV in 4 weeks to follow up on symptoms.

## 2024-06-20 LAB
ALBUMIN SERPL ELPH-MCNC: 5 G/DL
ALP BLD-CCNC: 90 U/L
ALT SERPL-CCNC: 17 U/L
ANION GAP SERPL CALC-SCNC: 14 MMOL/L
AST SERPL-CCNC: 17 U/L
BASOPHILS # BLD AUTO: 0.04 K/UL
BASOPHILS NFR BLD AUTO: 0.7 %
BILIRUB SERPL-MCNC: 0.5 MG/DL
BUN SERPL-MCNC: 11 MG/DL
CALCIUM SERPL-MCNC: 9.6 MG/DL
CDIFF BY PCR: NOT DETECTED
CHLORIDE SERPL-SCNC: 103 MMOL/L
CO2 SERPL-SCNC: 23 MMOL/L
CREAT SERPL-MCNC: 0.52 MG/DL
CRP SERPL-MCNC: <3 MG/L
DEPRECATED O AND P PREP STL: NORMAL
EGFR: 109 ML/MIN/1.73M2
ENDOMYSIUM IGA SER QL: NEGATIVE
ENDOMYSIUM IGA TITR SER: NORMAL
EOSINOPHIL # BLD AUTO: 0.06 K/UL
EOSINOPHIL NFR BLD AUTO: 1 %
GLIADIN IGA SER QL: 1.5 U/ML
GLIADIN IGG SER QL: <0.4 U/ML
GLIADIN PEPTIDE IGA SER-ACNC: NEGATIVE
GLIADIN PEPTIDE IGG SER-ACNC: NEGATIVE
GLUCOSE SERPL-MCNC: 93 MG/DL
HCT VFR BLD CALC: 39.1 %
HGB BLD-MCNC: 13 G/DL
IGA SER QL IEP: 330 MG/DL
IMM GRANULOCYTES NFR BLD AUTO: 0.2 %
LYMPHOCYTES # BLD AUTO: 2.56 K/UL
LYMPHOCYTES NFR BLD AUTO: 41.8 %
MAN DIFF?: NORMAL
MCHC RBC-ENTMCNC: 30.2 PG
MCHC RBC-ENTMCNC: 33.2 GM/DL
MCV RBC AUTO: 90.9 FL
MONOCYTES # BLD AUTO: 0.32 K/UL
MONOCYTES NFR BLD AUTO: 5.2 %
NEUTROPHILS # BLD AUTO: 3.14 K/UL
NEUTROPHILS NFR BLD AUTO: 51.1 %
PLATELET # BLD AUTO: 275 K/UL
POTASSIUM SERPL-SCNC: 4.3 MMOL/L
PROT SERPL-MCNC: 7.4 G/DL
RBC # BLD: 4.3 M/UL
RBC # FLD: 13.3 %
SODIUM SERPL-SCNC: 139 MMOL/L
TSH SERPL-ACNC: 1.35 UIU/ML
TTG IGA SER IA-ACNC: <0.5 U/ML
TTG IGA SER-ACNC: NEGATIVE
TTG IGG SER IA-ACNC: <0.8 U/ML
TTG IGG SER IA-ACNC: NEGATIVE
WBC # FLD AUTO: 6.13 K/UL

## 2024-06-21 DIAGNOSIS — K21.9 GASTRO-ESOPHAGEAL REFLUX DISEASE W/OUT ESOPHAGITIS: ICD-10-CM

## 2024-06-21 LAB — BACTERIA STL CULT: NORMAL

## 2024-06-21 RX ORDER — FAMOTIDINE 20 MG/1
20 TABLET, FILM COATED ORAL
Qty: 60 | Refills: 3 | Status: ACTIVE | COMMUNITY
Start: 2024-06-21 | End: 1900-01-01

## 2024-06-24 LAB — PANCREATIC ELASTASE, FECAL: 776 CD:794062645

## 2024-06-28 LAB — CALPROTECTIN FECAL: 27 UG/G

## 2024-07-29 ENCOUNTER — RESULT REVIEW (OUTPATIENT)
Age: 59
End: 2024-07-29

## 2024-07-30 ENCOUNTER — NON-APPOINTMENT (OUTPATIENT)
Age: 59
End: 2024-07-30

## 2024-07-30 DIAGNOSIS — I99.8 OTHER DISORDER OF CIRCULATORY SYSTEM: ICD-10-CM

## 2024-09-03 ENCOUNTER — APPOINTMENT (OUTPATIENT)
Dept: INTERNAL MEDICINE | Facility: CLINIC | Age: 59
End: 2024-09-03

## 2024-09-04 ENCOUNTER — TRANSCRIPTION ENCOUNTER (OUTPATIENT)
Age: 59
End: 2024-09-04

## 2024-10-10 ENCOUNTER — NON-APPOINTMENT (OUTPATIENT)
Age: 59
End: 2024-10-10

## 2024-10-10 ENCOUNTER — LABORATORY RESULT (OUTPATIENT)
Age: 59
End: 2024-10-10

## 2024-10-10 ENCOUNTER — APPOINTMENT (OUTPATIENT)
Dept: INTERNAL MEDICINE | Facility: CLINIC | Age: 59
End: 2024-10-10
Payer: MEDICAID

## 2024-10-10 VITALS
BODY MASS INDEX: 22.08 KG/M2 | DIASTOLIC BLOOD PRESSURE: 70 MMHG | HEART RATE: 66 BPM | SYSTOLIC BLOOD PRESSURE: 110 MMHG | WEIGHT: 120 LBS | HEIGHT: 62 IN

## 2024-10-10 DIAGNOSIS — E55.9 VITAMIN D DEFICIENCY, UNSPECIFIED: ICD-10-CM

## 2024-10-10 DIAGNOSIS — E78.2 MIXED HYPERLIPIDEMIA: ICD-10-CM

## 2024-10-10 DIAGNOSIS — Z23 ENCOUNTER FOR IMMUNIZATION: ICD-10-CM

## 2024-10-10 DIAGNOSIS — Z00.00 ENCOUNTER FOR GENERAL ADULT MEDICAL EXAMINATION W/OUT ABNORMAL FINDINGS: ICD-10-CM

## 2024-10-10 PROCEDURE — G0008: CPT

## 2024-10-10 PROCEDURE — 99396 PREV VISIT EST AGE 40-64: CPT | Mod: 25

## 2024-10-10 PROCEDURE — 93000 ELECTROCARDIOGRAM COMPLETE: CPT

## 2024-10-10 PROCEDURE — 90656 IIV3 VACC NO PRSV 0.5 ML IM: CPT

## 2024-10-12 LAB
25(OH)D3 SERPL-MCNC: 15.4 NG/ML
ALBUMIN SERPL ELPH-MCNC: 4.5 G/DL
ALP BLD-CCNC: 86 U/L
ALT SERPL-CCNC: 14 U/L
ANION GAP SERPL CALC-SCNC: 13 MMOL/L
APPEARANCE: CLEAR
AST SERPL-CCNC: 16 U/L
BASOPHILS # BLD AUTO: 0.04 K/UL
BASOPHILS NFR BLD AUTO: 0.7 %
BILIRUB SERPL-MCNC: 0.4 MG/DL
BILIRUBIN URINE: NEGATIVE
BLOOD URINE: ABNORMAL
BUN SERPL-MCNC: 13 MG/DL
CALCIUM SERPL-MCNC: 9.5 MG/DL
CHLORIDE SERPL-SCNC: 105 MMOL/L
CHOLEST SERPL-MCNC: 260 MG/DL
CO2 SERPL-SCNC: 24 MMOL/L
COLOR: YELLOW
CREAT SERPL-MCNC: 0.5 MG/DL
EGFR: 110 ML/MIN/1.73M2
EOSINOPHIL # BLD AUTO: 0.18 K/UL
EOSINOPHIL NFR BLD AUTO: 2.9 %
GLUCOSE QUALITATIVE U: NEGATIVE MG/DL
GLUCOSE SERPL-MCNC: 104 MG/DL
HCT VFR BLD CALC: 38.5 %
HDLC SERPL-MCNC: 42 MG/DL
HGB BLD-MCNC: 12.3 G/DL
IMM GRANULOCYTES NFR BLD AUTO: 0.2 %
KETONES URINE: NEGATIVE MG/DL
LDLC SERPL CALC-MCNC: 181 MG/DL
LEUKOCYTE ESTERASE URINE: ABNORMAL
LYMPHOCYTES # BLD AUTO: 2.61 K/UL
LYMPHOCYTES NFR BLD AUTO: 42.4 %
MAN DIFF?: NORMAL
MCHC RBC-ENTMCNC: 30.1 PG
MCHC RBC-ENTMCNC: 31.9 GM/DL
MCV RBC AUTO: 94.4 FL
MONOCYTES # BLD AUTO: 0.36 K/UL
MONOCYTES NFR BLD AUTO: 5.9 %
NEUTROPHILS # BLD AUTO: 2.95 K/UL
NEUTROPHILS NFR BLD AUTO: 47.9 %
NITRITE URINE: NEGATIVE
NONHDLC SERPL-MCNC: 218 MG/DL
PH URINE: 5.5
PLATELET # BLD AUTO: 278 K/UL
POTASSIUM SERPL-SCNC: 4.5 MMOL/L
PROT SERPL-MCNC: 7.1 G/DL
PROTEIN URINE: NORMAL MG/DL
RBC # BLD: 4.08 M/UL
RBC # FLD: 13.2 %
SODIUM SERPL-SCNC: 142 MMOL/L
SPECIFIC GRAVITY URINE: 1.02
TRIGL SERPL-MCNC: 192 MG/DL
TSH SERPL-ACNC: 1.5 UIU/ML
UROBILINOGEN URINE: 0.2 MG/DL
WBC # FLD AUTO: 6.15 K/UL

## 2025-05-22 ENCOUNTER — APPOINTMENT (OUTPATIENT)
Dept: INTERNAL MEDICINE | Facility: CLINIC | Age: 60
End: 2025-05-22
Payer: MEDICAID

## 2025-05-22 VITALS
DIASTOLIC BLOOD PRESSURE: 70 MMHG | WEIGHT: 122 LBS | HEART RATE: 68 BPM | BODY MASS INDEX: 23.95 KG/M2 | SYSTOLIC BLOOD PRESSURE: 130 MMHG | HEIGHT: 60 IN

## 2025-05-22 DIAGNOSIS — R23.3 SPONTANEOUS ECCHYMOSES: ICD-10-CM

## 2025-05-22 PROCEDURE — G2211 COMPLEX E/M VISIT ADD ON: CPT | Mod: NC

## 2025-05-22 PROCEDURE — 99213 OFFICE O/P EST LOW 20 MIN: CPT

## 2025-05-27 ENCOUNTER — APPOINTMENT (OUTPATIENT)
Dept: NEUROLOGY | Facility: CLINIC | Age: 60
End: 2025-05-27
Payer: MEDICAID

## 2025-05-27 VITALS
HEART RATE: 78 BPM | BODY MASS INDEX: 22.45 KG/M2 | RESPIRATION RATE: 16 BRPM | WEIGHT: 122 LBS | OXYGEN SATURATION: 97 % | DIASTOLIC BLOOD PRESSURE: 72 MMHG | SYSTOLIC BLOOD PRESSURE: 130 MMHG | HEIGHT: 62 IN

## 2025-05-27 DIAGNOSIS — R20.2 PARESTHESIA OF SKIN: ICD-10-CM

## 2025-05-27 DIAGNOSIS — R26.89 OTHER ABNORMALITIES OF GAIT AND MOBILITY: ICD-10-CM

## 2025-05-27 DIAGNOSIS — E55.9 VITAMIN D DEFICIENCY, UNSPECIFIED: ICD-10-CM

## 2025-05-27 LAB
ALBUMIN SERPL ELPH-MCNC: 4.7 G/DL
ALP BLD-CCNC: 107 U/L
ALT SERPL-CCNC: 23 U/L
ANION GAP SERPL CALC-SCNC: 14 MMOL/L
APTT BLD: 33.2 SEC
AST SERPL-CCNC: 25 U/L
BASOPHILS # BLD AUTO: 0.05 K/UL
BASOPHILS NFR BLD AUTO: 0.6 %
BILIRUB SERPL-MCNC: 0.4 MG/DL
BUN SERPL-MCNC: 16 MG/DL
CALCIUM SERPL-MCNC: 9.6 MG/DL
CHLORIDE SERPL-SCNC: 103 MMOL/L
CO2 SERPL-SCNC: 23 MMOL/L
CREAT SERPL-MCNC: 0.54 MG/DL
EGFRCR SERPLBLD CKD-EPI 2021: 107 ML/MIN/1.73M2
EOSINOPHIL # BLD AUTO: 0.14 K/UL
EOSINOPHIL NFR BLD AUTO: 1.7 %
GLUCOSE SERPL-MCNC: 100 MG/DL
HCT VFR BLD CALC: 39.8 %
HGB BLD-MCNC: 12.9 G/DL
IMM GRANULOCYTES NFR BLD AUTO: 0.2 %
INR PPP: 0.91 RATIO
LYMPHOCYTES # BLD AUTO: 3.6 K/UL
LYMPHOCYTES NFR BLD AUTO: 43 %
MAN DIFF?: NORMAL
MCHC RBC-ENTMCNC: 30.1 PG
MCHC RBC-ENTMCNC: 32.4 G/DL
MCV RBC AUTO: 93 FL
MONOCYTES # BLD AUTO: 0.39 K/UL
MONOCYTES NFR BLD AUTO: 4.7 %
NEUTROPHILS # BLD AUTO: 4.17 K/UL
NEUTROPHILS NFR BLD AUTO: 49.8 %
PLATELET # BLD AUTO: 334 K/UL
POTASSIUM SERPL-SCNC: 3.9 MMOL/L
PROT SERPL-MCNC: 7.6 G/DL
PT BLD: 10.8 SEC
RBC # BLD: 4.28 M/UL
RBC # FLD: 13.1 %
SODIUM SERPL-SCNC: 140 MMOL/L
WBC # FLD AUTO: 8.37 K/UL

## 2025-05-27 PROCEDURE — 99205 OFFICE O/P NEW HI 60 MIN: CPT

## 2025-05-27 RX ORDER — AMOXICILLIN AND CLAVULANATE POTASSIUM 875; 125 MG/1; MG/1
875-125 TABLET, COATED ORAL
Qty: 14 | Refills: 0 | Status: DISCONTINUED | COMMUNITY
Start: 2025-02-01 | End: 2025-05-27

## 2025-05-27 RX ORDER — TROSPIUM CHLORIDE 60 MG/1
60 CAPSULE, EXTENDED RELEASE ORAL
Qty: 30 | Refills: 0 | Status: DISCONTINUED | COMMUNITY
Start: 2025-01-27 | End: 2025-05-27

## 2025-05-27 RX ORDER — KETOCONAZOLE 20 MG/G
2 CREAM TOPICAL
Qty: 60 | Refills: 0 | Status: DISCONTINUED | COMMUNITY
Start: 2025-01-28 | End: 2025-05-27

## 2025-05-27 RX ORDER — MIRABEGRON 50 MG/1
50 TABLET, FILM COATED, EXTENDED RELEASE ORAL
Qty: 30 | Refills: 0 | Status: ACTIVE | COMMUNITY
Start: 2025-04-28

## 2025-05-27 RX ORDER — CLOTRIMAZOLE AND BETAMETHASONE DIPROPIONATE 10; .5 MG/G; MG/G
1-0.05 CREAM TOPICAL
Qty: 45 | Refills: 0 | Status: DISCONTINUED | COMMUNITY
Start: 2025-05-19 | End: 2025-05-27

## 2025-05-28 PROBLEM — R26.89 BALANCE PROBLEM: Status: ACTIVE | Noted: 2025-05-28

## 2025-05-28 LAB
25(OH)D3 SERPL-MCNC: 17.9 NG/ML
ALBUMIN MFR SERPL ELPH: 61.7 %
ALBUMIN SERPL-MCNC: 4.4 G/DL
ALBUMIN/GLOB SERPL: 1.6 RATIO
ALPHA1 GLOB MFR SERPL ELPH: 3.1 %
ALPHA1 GLOB SERPL ELPH-MCNC: 0.2 G/DL
ALPHA2 GLOB MFR SERPL ELPH: 9.7 %
ALPHA2 GLOB SERPL ELPH-MCNC: 0.7 G/DL
B-GLOBULIN MFR SERPL ELPH: 12.4 %
B-GLOBULIN SERPL ELPH-MCNC: 0.9 G/DL
ESTIMATED AVERAGE GLUCOSE: 128 MG/DL
FOLATE SERPL-MCNC: 13.9 NG/ML
GAMMA GLOB FLD ELPH-MCNC: 0.9 G/DL
GAMMA GLOB MFR SERPL ELPH: 13.1 %
HBA1C MFR BLD HPLC: 6.1 %
INTERPRETATION SERPL IEP-IMP: NORMAL
PROT SERPL-MCNC: 7.1 G/DL
PROT SERPL-MCNC: 7.1 G/DL
VIT B12 SERPL-MCNC: 454 PG/ML

## 2025-05-29 PROBLEM — Z80.3 FAMILY HISTORY OF BREAST CANCER: Status: ACTIVE | Noted: 2025-05-29

## 2025-05-29 PROBLEM — Z12.31 OTHER SCREENING MAMMOGRAM: Status: ACTIVE | Noted: 2025-05-29

## 2025-06-02 LAB
ALBUPE: 14.1 %
ALPHA1UPE: 40.9 %
ALPHA2UPE: 19.4 %
BETAUPE: 14.8 %
GAMMAUPE: 10.8 %
IGA 24H UR QL IFE: NORMAL
KAPPA LC 24H UR QL: NORMAL
PROT PATTERN 24H UR ELPH-IMP: NORMAL
PROT UR-MCNC: 5 MG/DL
PROT UR-MCNC: 5 MG/DL
VIT B1 SERPL-MCNC: 121.1 NMOL/L
VIT B6 SERPL-MCNC: 13.1 UG/L

## 2025-06-03 ENCOUNTER — APPOINTMENT (OUTPATIENT)
Dept: OBGYN | Facility: CLINIC | Age: 60
End: 2025-06-03

## 2025-06-03 DIAGNOSIS — Z80.3 FAMILY HISTORY OF MALIGNANT NEOPLASM OF BREAST: ICD-10-CM

## 2025-06-05 ENCOUNTER — APPOINTMENT (OUTPATIENT)
Dept: NEUROLOGY | Facility: CLINIC | Age: 60
End: 2025-06-05

## 2025-06-05 DIAGNOSIS — G62.9 POLYNEUROPATHY, UNSPECIFIED: ICD-10-CM

## 2025-06-05 PROCEDURE — 95911 NRV CNDJ TEST 9-10 STUDIES: CPT

## 2025-06-05 PROCEDURE — 95886 MUSC TEST DONE W/N TEST COMP: CPT

## 2025-06-12 ENCOUNTER — APPOINTMENT (OUTPATIENT)
Dept: OBGYN | Facility: CLINIC | Age: 60
End: 2025-06-12
Payer: MEDICAID

## 2025-06-12 VITALS
HEIGHT: 62 IN | BODY MASS INDEX: 22.26 KG/M2 | WEIGHT: 121 LBS | DIASTOLIC BLOOD PRESSURE: 70 MMHG | SYSTOLIC BLOOD PRESSURE: 114 MMHG

## 2025-06-12 PROBLEM — E04.1 LEFT THYROID NODULE: Status: ACTIVE | Noted: 2025-06-12

## 2025-06-12 PROCEDURE — 99396 PREV VISIT EST AGE 40-64: CPT

## 2025-06-12 PROCEDURE — 99459 PELVIC EXAMINATION: CPT

## 2025-06-12 RX ORDER — GABAPENTIN 300 MG/1
300 CAPSULE ORAL
Qty: 30 | Refills: 2 | Status: ACTIVE | COMMUNITY
Start: 2025-06-11 | End: 1900-01-01

## 2025-06-19 ENCOUNTER — RESULT REVIEW (OUTPATIENT)
Age: 60
End: 2025-06-19

## 2025-06-23 ENCOUNTER — NON-APPOINTMENT (OUTPATIENT)
Age: 60
End: 2025-06-23

## 2025-07-17 ENCOUNTER — LABORATORY RESULT (OUTPATIENT)
Age: 60
End: 2025-07-17

## 2025-07-30 ENCOUNTER — RESULT REVIEW (OUTPATIENT)
Age: 60
End: 2025-07-30

## 2025-08-15 DIAGNOSIS — R73.09 OTHER ABNORMAL GLUCOSE: ICD-10-CM

## 2025-08-21 ENCOUNTER — APPOINTMENT (OUTPATIENT)
Dept: NEUROLOGY | Facility: CLINIC | Age: 60
End: 2025-08-21
Payer: MEDICAID

## 2025-08-21 VITALS
OXYGEN SATURATION: 98 % | WEIGHT: 125 LBS | HEART RATE: 64 BPM | SYSTOLIC BLOOD PRESSURE: 134 MMHG | BODY MASS INDEX: 22.86 KG/M2 | DIASTOLIC BLOOD PRESSURE: 74 MMHG

## 2025-08-21 DIAGNOSIS — G62.89 OTHER SPECIFIED POLYNEUROPATHIES: ICD-10-CM

## 2025-08-21 DIAGNOSIS — R26.89 OTHER ABNORMALITIES OF GAIT AND MOBILITY: ICD-10-CM

## 2025-08-21 DIAGNOSIS — E55.9 VITAMIN D DEFICIENCY, UNSPECIFIED: ICD-10-CM

## 2025-08-21 DIAGNOSIS — R76.8 OTHER SPECIFIED ABNORMAL IMMUNOLOGICAL FINDINGS IN SERUM: ICD-10-CM

## 2025-08-21 PROCEDURE — 99215 OFFICE O/P EST HI 40 MIN: CPT

## 2025-08-21 RX ORDER — ERGOCALCIFEROL 1.25 MG/1
1.25 MG CAPSULE, LIQUID FILLED ORAL
Qty: 12 | Refills: 0 | Status: ACTIVE | COMMUNITY
Start: 2025-08-21 | End: 1900-01-01